# Patient Record
Sex: FEMALE | Race: WHITE | NOT HISPANIC OR LATINO | Employment: FULL TIME | URBAN - NONMETROPOLITAN AREA
[De-identification: names, ages, dates, MRNs, and addresses within clinical notes are randomized per-mention and may not be internally consistent; named-entity substitution may affect disease eponyms.]

---

## 2020-04-20 ENCOUNTER — TELEPHONE (OUTPATIENT)
Dept: OTOLARYNGOLOGY | Facility: CLINIC | Age: 30
End: 2020-04-20

## 2020-05-05 ENCOUNTER — TELEPHONE (OUTPATIENT)
Dept: OTOLARYNGOLOGY | Facility: CLINIC | Age: 30
End: 2020-05-05

## 2020-05-06 ENCOUNTER — TELEPHONE (OUTPATIENT)
Dept: OTOLARYNGOLOGY | Facility: CLINIC | Age: 30
End: 2020-05-06

## 2020-12-04 ENCOUNTER — OFFICE VISIT (OUTPATIENT)
Dept: OBSTETRICS AND GYNECOLOGY | Facility: CLINIC | Age: 30
End: 2020-12-04

## 2020-12-04 VITALS
DIASTOLIC BLOOD PRESSURE: 70 MMHG | WEIGHT: 177 LBS | HEIGHT: 64 IN | SYSTOLIC BLOOD PRESSURE: 108 MMHG | BODY MASS INDEX: 30.22 KG/M2

## 2020-12-04 DIAGNOSIS — Z01.419 WOMEN'S ANNUAL ROUTINE GYNECOLOGICAL EXAMINATION: Primary | ICD-10-CM

## 2020-12-04 PROCEDURE — 99385 PREV VISIT NEW AGE 18-39: CPT | Performed by: OBSTETRICS & GYNECOLOGY

## 2020-12-04 RX ORDER — NICOTINE POLACRILEX 2 MG
GUM BUCCAL
COMMUNITY
End: 2021-09-07 | Stop reason: HOSPADM

## 2020-12-04 RX ORDER — CALCIUM CITRATE 1040MG
TABLET ORAL
COMMUNITY
End: 2020-12-04

## 2020-12-04 RX ORDER — UBIDECARENONE 75 MG
CAPSULE ORAL
COMMUNITY
End: 2021-09-07 | Stop reason: HOSPADM

## 2020-12-04 RX ORDER — FOLIC ACID 1 MG/1
TABLET ORAL
COMMUNITY
End: 2021-09-07 | Stop reason: HOSPADM

## 2020-12-04 NOTE — PROGRESS NOTES
[facility]  Gynecology    CC: Annual well woman exam    HPI  Ginette Lara is a 30 y.o.  premenopausal female who presents for her annual well woman exam.  The patient has no complaints.  Denies any vaginal itching, burning, irritation, or discharge.  Denies any abnormal uterine bleeding.  Continues to be sexually active.  Denies any sexual dysfunction concerns.  Denies any urinary symptoms including incontinence, dysuria, frequency, urgency, nocturia.    She exercises regularly: yes.  She wears her seat belt:yes.  She has concerns about domestic violence: no.  She has noticed changes in height: no.    ROS  Review of Systems   Constitutional: Negative.    HENT: Negative.    Eyes: Negative.    Respiratory: Negative.    Cardiovascular: Negative.    Gastrointestinal: Negative.    Endocrine: Negative.    Genitourinary: Negative.    Musculoskeletal: Positive for back pain.   Skin: Negative.    Neurological: Negative.    Hematological: Negative.    Psychiatric/Behavioral: Negative.       HEALTH MAINTENANCE  Mammogram: N/A  Colonoscopy: N/A  DEXA scan: N/A  Pap smear: 2018    GYN HISTORY  Menarche: age 14  Menses: Regular, every 28 days, lasts 4-7 days, normal flow, no intermenstrual bleeding  History of STIs: None  Last pap smear: 2018, WNL  Abnormal pap smear history: None  Contraception: Condoms    OB HISTORY  OB History    Para Term  AB Living   1 1 1     1   SAB TAB Ectopic Molar Multiple Live Births             1      # Outcome Date GA Lbr Lorne/2nd Weight Sex Delivery Anes PTL Lv   1 Term 10/21/15 39w4d  3345 g (7 lb 6 oz) M Vag-Spont EPI N JENNY     PAST MEDICAL HISTORY  Past Medical History:   Diagnosis Date   • No pertinent past medical history      PAST SURGICAL HISTORY  Past Surgical History:   Procedure Laterality Date   • GASTRIC SLEEVE LAPAROSCOPIC     • MOUTH SURGERY N/A      FAMILY HISTORY  Family History   Problem Relation Age of Onset   • Ovarian cancer  "Neg Hx    • Uterine cancer Neg Hx    • Breast cancer Neg Hx    • Colon cancer Neg Hx    • Cervical cancer Neg Hx      SOCIAL HISTORY  Social History     Socioeconomic History   • Marital status:      Spouse name: Not on file   • Number of children: Not on file   • Years of education: Not on file   • Highest education level: Not on file   Tobacco Use   • Smoking status: Never Smoker   • Smokeless tobacco: Never Used   Substance and Sexual Activity   • Alcohol use: Never     Frequency: Never   • Drug use: Never   • Sexual activity: Yes     HOME MEDICATIONS  Prior to Admission medications    Medication Sig Start Date End Date Taking? Authorizing Provider   Biotin 1 MG capsule biotin   Yes Kamaljit Davison MD   folic acid (FOLVITE) 0.5 MG half tablet folic acid   Yes Kamaljit Davison MD   Prenatal Vit-Fe Fumarate-FA (PRENATAL 1+1 PO) Take 1 tablet by mouth Daily.   Yes Kamaljit Davison MD   Vitamin A 15 MG/ML solution injection vitamin A   Yes Kamaljit Davison MD   vitamin B-12 (CYANOCOBALAMIN) 100 MCG tablet Vitamin B12   Yes Kamaljit Davison MD   Calcium Citrate 1040 MG tablet calcium citrate  12/4/20  Kamaljit Davison MD     ALLERGIES  Allergies   Allergen Reactions   • Amoxicillin-Pot Clavulanate Rash     PE  /70   Ht 162.6 cm (64\")   Wt 80.3 kg (177 lb)   LMP 11/28/2020 (Approximate)   BMI 30.38 kg/m²        General: Alert, healthy, no distress, well nourished and well developed   Neurologic: Alert, oriented to person, place, and time.  Gait normal.  Cranial nerves II-XII grossly intact.  HEENT: Normocephalic, atraumatic.  Extraocular muscles intact, pupils equal and reactive times two.    Neck: Supple, no adenopathy, thyroid normal size, non-tender, without nodularity, trachea midline   Breasts: Declined  Lungs: Normal respiratory effort.  Clear to auscultation bilaterally.   Heart: Regular rate and rhythm, without murmer, rub or gallop.   Abdomen: Soft, non-tender, " non-distended,no masses, no hepatosplenomegaly, no hernia.    Skin: No rash, no lesions.  Extremities: No cyanosis, clubbing or edema  PELVIC EXAM:  External Genitalia/Vulva: Anatomy is normal, no significant redness of labia, no discharge on vulvar tissues, [place]'s and Bartholin's glands are normal, no ulcers, no condylomatous lesions.  Urethral meatus: Normal, no lesions, no prolapse.  Urethra: Normal, no masses, no tenderness with palpation.  Bladder: Normal, no fullness, no masses, no tenderness with palpation.  Vagina: Vaginal tissues are not inflamed, normal color and texture, no significant discharge present.  Pelvic support adequate.  Cervix: Normal, no lesions, no purulent discharge, no cervical motion tenderness.  Uterus: Normal size, shape, and consistency.  Good mobility noted.  Minimal descent noted with good support.  Adnexa: Normal size and shape bilaterally, no palpable mass bilaterally and non-tender bilaterally.  Rectal: Normal, no masses or polyps, confirms bimanual exam, perianal normal, no lesions; NANCY deferred.    IMPRESSION  Ginette Lara is a 30 y.o.  presenting with annual gynecological exam.    PLAN    1. Women's annual routine gynecological examination  - Liquid-based Pap Smear, Screening  - RTC 1 year  - Declines contraception at this time    This document has been electronically signed by Clarissa Carias MD on 2020 16:16 CST.    CC: Patrick Sellers MD

## 2020-12-10 LAB
LAB AP CASE REPORT: NORMAL
PATH INTERP SPEC-IMP: NORMAL

## 2021-09-07 ENCOUNTER — LAB (OUTPATIENT)
Dept: LAB | Facility: HOSPITAL | Age: 31
End: 2021-09-07

## 2021-09-07 ENCOUNTER — INITIAL PRENATAL (OUTPATIENT)
Dept: OBSTETRICS AND GYNECOLOGY | Facility: CLINIC | Age: 31
End: 2021-09-07

## 2021-09-07 VITALS — WEIGHT: 173 LBS | BODY MASS INDEX: 29.7 KG/M2 | DIASTOLIC BLOOD PRESSURE: 58 MMHG | SYSTOLIC BLOOD PRESSURE: 90 MMHG

## 2021-09-07 DIAGNOSIS — O36.80X0 ENCOUNTER TO DETERMINE FETAL VIABILITY OF PREGNANCY, SINGLE OR UNSPECIFIED FETUS: ICD-10-CM

## 2021-09-07 DIAGNOSIS — Z32.01 POSITIVE PREGNANCY TEST: ICD-10-CM

## 2021-09-07 DIAGNOSIS — Z34.80 SUPERVISION OF OTHER NORMAL PREGNANCY: Primary | ICD-10-CM

## 2021-09-07 DIAGNOSIS — Z32.00 PREGNANCY EXAMINATION OR TEST, PREGNANCY UNCONFIRMED: ICD-10-CM

## 2021-09-07 LAB
ABO GROUP BLD: NORMAL
AMPHET+METHAMPHET UR QL: NEGATIVE
AMPHETAMINES UR QL: NEGATIVE
B-HCG UR QL: POSITIVE
BARBITURATES UR QL SCN: NEGATIVE
BASOPHILS # BLD AUTO: 0.04 10*3/MM3 (ref 0–0.2)
BASOPHILS NFR BLD AUTO: 0.5 % (ref 0–1.5)
BENZODIAZ UR QL SCN: NEGATIVE
BILIRUB UR QL STRIP: NEGATIVE
BLD GP AB SCN SERPL QL: NEGATIVE
BUPRENORPHINE SERPL-MCNC: NEGATIVE NG/ML
CANNABINOIDS SERPL QL: NEGATIVE
CLARITY UR: CLEAR
COCAINE UR QL: NEGATIVE
COLOR UR: ABNORMAL
DEPRECATED RDW RBC AUTO: 40.8 FL (ref 37–54)
EOSINOPHIL # BLD AUTO: 0.23 10*3/MM3 (ref 0–0.4)
EOSINOPHIL NFR BLD AUTO: 3 % (ref 0.3–6.2)
ERYTHROCYTE [DISTWIDTH] IN BLOOD BY AUTOMATED COUNT: 13.6 % (ref 12.3–15.4)
GLUCOSE UR STRIP-MCNC: NEGATIVE MG/DL
HBV SURFACE AG SERPL QL IA: NORMAL
HCG INTACT+B SERPL-ACNC: NORMAL MIU/ML
HCT VFR BLD AUTO: 38.8 % (ref 34–46.6)
HCV AB SER DONR QL: NORMAL
HGB BLD-MCNC: 12.6 G/DL (ref 12–15.9)
HGB UR QL STRIP.AUTO: NEGATIVE
HIV1+2 AB SER QL: NORMAL
IMM GRANULOCYTES # BLD AUTO: 0.02 10*3/MM3 (ref 0–0.05)
IMM GRANULOCYTES NFR BLD AUTO: 0.3 % (ref 0–0.5)
INTERNAL NEGATIVE CONTROL: NEGATIVE
INTERNAL POSITIVE CONTROL: POSITIVE
KETONES UR QL STRIP: NEGATIVE
LEUKOCYTE ESTERASE UR QL STRIP.AUTO: ABNORMAL
LYMPHOCYTES # BLD AUTO: 2.43 10*3/MM3 (ref 0.7–3.1)
LYMPHOCYTES NFR BLD AUTO: 31.2 % (ref 19.6–45.3)
Lab: ABNORMAL
Lab: NORMAL
MCH RBC QN AUTO: 27 PG (ref 26.6–33)
MCHC RBC AUTO-ENTMCNC: 32.5 G/DL (ref 31.5–35.7)
MCV RBC AUTO: 83.3 FL (ref 79–97)
METHADONE UR QL SCN: NEGATIVE
MONOCYTES # BLD AUTO: 0.37 10*3/MM3 (ref 0.1–0.9)
MONOCYTES NFR BLD AUTO: 4.8 % (ref 5–12)
NEUTROPHILS NFR BLD AUTO: 4.69 10*3/MM3 (ref 1.7–7)
NEUTROPHILS NFR BLD AUTO: 60.2 % (ref 42.7–76)
NITRITE UR QL STRIP: NEGATIVE
NRBC BLD AUTO-RTO: 0 /100 WBC (ref 0–0.2)
OPIATES UR QL: NEGATIVE
OXYCODONE UR QL SCN: NEGATIVE
PCP UR QL SCN: NEGATIVE
PH UR STRIP.AUTO: 7.5 [PH] (ref 5–8)
PLATELET # BLD AUTO: 282 10*3/MM3 (ref 140–450)
PMV BLD AUTO: 9.5 FL (ref 6–12)
PROPOXYPH UR QL: NEGATIVE
PROT UR QL STRIP: NEGATIVE
RBC # BLD AUTO: 4.66 10*6/MM3 (ref 3.77–5.28)
RH BLD: POSITIVE
RPR SER QL: NORMAL
SP GR UR STRIP: 1.02 (ref 1–1.03)
TRICYCLICS UR QL SCN: NEGATIVE
UROBILINOGEN UR QL STRIP: ABNORMAL
WBC # BLD AUTO: 7.78 10*3/MM3 (ref 3.4–10.8)

## 2021-09-07 PROCEDURE — 36415 COLL VENOUS BLD VENIPUNCTURE: CPT

## 2021-09-07 PROCEDURE — 80081 OBSTETRIC PANEL INC HIV TSTG: CPT | Performed by: NURSE PRACTITIONER

## 2021-09-07 PROCEDURE — 86803 HEPATITIS C AB TEST: CPT | Performed by: NURSE PRACTITIONER

## 2021-09-07 PROCEDURE — 84702 CHORIONIC GONADOTROPIN TEST: CPT | Performed by: NURSE PRACTITIONER

## 2021-09-07 PROCEDURE — 81003 URINALYSIS AUTO W/O SCOPE: CPT | Performed by: NURSE PRACTITIONER

## 2021-09-07 PROCEDURE — 87086 URINE CULTURE/COLONY COUNT: CPT | Performed by: NURSE PRACTITIONER

## 2021-09-07 PROCEDURE — 87491 CHLMYD TRACH DNA AMP PROBE: CPT | Performed by: NURSE PRACTITIONER

## 2021-09-07 PROCEDURE — 87591 N.GONORRHOEAE DNA AMP PROB: CPT | Performed by: NURSE PRACTITIONER

## 2021-09-07 PROCEDURE — 87661 TRICHOMONAS VAGINALIS AMPLIF: CPT | Performed by: NURSE PRACTITIONER

## 2021-09-07 PROCEDURE — 80306 DRUG TEST PRSMV INSTRMNT: CPT | Performed by: NURSE PRACTITIONER

## 2021-09-07 PROCEDURE — 81025 URINE PREGNANCY TEST: CPT | Performed by: NURSE PRACTITIONER

## 2021-09-07 RX ORDER — MULTIPLE VITAMINS W/ MINERALS TAB 9MG-400MCG
1 TAB ORAL DAILY
COMMUNITY
End: 2022-05-09

## 2021-09-07 NOTE — PROGRESS NOTES
I spent approximately 40 minutes with the patient acquiring the health and history intake and discussing topics related to healthy lifestyle. She had a positive home pregnancy test. Her UPT in office today is positive. Her LMP is 7/22/21. This is her 2nd pregnancy. She had a vaginal delivery at [facility] in [place] on 10/21/15. Her son was delivered by Dr. Abebe. She signed a release for us to get her delivery note.   A newob bag is given. The 1st trimester teaching was done with the patient. We discussed a healthy diet and exercise and what is recommended. I also discussed Listeriosis and Toxoplasmosis and what fish to avoid due to high mercury levels. I informed patient not to be in hot tubs, saunas, or tanning beds. I encouraged her to make an appointment with the dentist if she has not had a dental exam and cleaning in the last 6 months. The patient denies use of alcohol, illicit drug use, and tobacco smoking. She plans to breastfeed. She  her son also.  I gave her pamphlet on breastfeeding classes and the breastfeeding mothers support group. These services are provided by [facility], Lactation [facility]. She filled out the depression screening questionnaire and scored 4. She did have postpartum depression after her son was born and was on Lexapro for a short period of time. She denies any problems today with depression.  I encouraged the patient to get the TDAP vaccine in the 3rd trimester.  I discussed with the patient that a pediatrician needs to be chosen prior to delivery for the infant to have an appointment scheduled before leaving the hospital. Her son sees a pediatrician in [place].  I discussed lab tests will be done today. Her last pap smear was negative on 12/4/20. All questions were answered at this time. She plans to see Dr. Carias this pregnancy. She is scheduled for an ultrasound and to see Dr. Guerra on 9/21/21.

## 2021-09-08 LAB
BACTERIA SPEC AEROBE CULT: ABNORMAL
C TRACH RRNA CVX QL NAA+PROBE: NEGATIVE
N GONORRHOEA RRNA SPEC QL NAA+PROBE: NEGATIVE
RUBV IGG SERPL IA-ACNC: 1.74 INDEX
TRICHOMONAS VAGINALIS PCR: NEGATIVE

## 2021-09-21 ENCOUNTER — INITIAL PRENATAL (OUTPATIENT)
Dept: OBSTETRICS AND GYNECOLOGY | Facility: CLINIC | Age: 31
End: 2021-09-21

## 2021-09-21 VITALS — WEIGHT: 147.8 LBS | SYSTOLIC BLOOD PRESSURE: 108 MMHG | DIASTOLIC BLOOD PRESSURE: 62 MMHG | BODY MASS INDEX: 25.37 KG/M2

## 2021-09-21 DIAGNOSIS — Z86.59 HISTORY OF POSTPARTUM DEPRESSION: ICD-10-CM

## 2021-09-21 DIAGNOSIS — O26.891 PREGNANCY HEADACHE IN FIRST TRIMESTER: ICD-10-CM

## 2021-09-21 DIAGNOSIS — Z34.80 SUPERVISION OF OTHER NORMAL PREGNANCY, ANTEPARTUM: Primary | ICD-10-CM

## 2021-09-21 DIAGNOSIS — R51.9 PREGNANCY HEADACHE IN FIRST TRIMESTER: ICD-10-CM

## 2021-09-21 DIAGNOSIS — Z87.59 HISTORY OF POSTPARTUM DEPRESSION: ICD-10-CM

## 2021-09-21 DIAGNOSIS — O09.299 HISTORY OF FORCEPS DELIVERY IN PRIOR PREGNANCY, CURRENTLY PREGNANT: ICD-10-CM

## 2021-09-21 DIAGNOSIS — O99.841 BARIATRIC SURGERY STATUS COMPLICATING PREGNANCY, FIRST TRIMESTER: ICD-10-CM

## 2021-09-21 DIAGNOSIS — O21.9 NAUSEA AND VOMITING DURING PREGNANCY: ICD-10-CM

## 2021-09-21 PROCEDURE — 0501F PRENATAL FLOW SHEET: CPT | Performed by: STUDENT IN AN ORGANIZED HEALTH CARE EDUCATION/TRAINING PROGRAM

## 2021-09-21 RX ORDER — PRENATAL VIT NO.126/IRON/FOLIC 28MG-0.8MG
1 TABLET ORAL DAILY
COMMUNITY

## 2021-09-21 RX ORDER — MAGNESIUM OXIDE 400 MG/1
400 TABLET ORAL DAILY
Qty: 30 TABLET | Refills: 2 | Status: SHIPPED | OUTPATIENT
Start: 2021-09-21 | End: 2022-05-09

## 2021-09-21 RX ORDER — DIPHENHYDRAMINE HYDROCHLORIDE 25 MG/1
25 CAPSULE ORAL 3 TIMES DAILY
Qty: 90 TABLET | Refills: 2 | Status: SHIPPED | OUTPATIENT
Start: 2021-09-21 | End: 2022-04-23 | Stop reason: HOSPADM

## 2021-09-21 NOTE — PATIENT INSTRUCTIONS
If vaginal bleeding, persistent cramping, uncontrollable nausea/vomiting, severe pain, call office or return.

## 2021-09-21 NOTE — PROGRESS NOTES
[facility]  Obstetrics Visit    CHIEF COMPLAINT:  New prenatal visit    HISTORY OF PRESENT ILLNESS:  Ginette Lara is a 31 y.o. y/o  at 8w5d by LMP (Patient's last menstrual period was 2021 (exact date).).  This was a planned pregnancy and the patient is supported by her .  Reports daily nausea with occasional vomiting. Still able to tolerate food and fluids. Taking PNV (taking vitamin without iron as previously caused her to have headaches). Reports breast tenderness.  She denies any vaginal bleeding or cramping. Daily headaches. Nonradiating, no aura or neuro symptoms. No vision changes. Not taking medications. Resolve with rest. History of headaches outside of pregnancy and similarly bad in first trimester last pregnancy.     Reports FAVD in last pregnancy after long labor due to maternal exhaustion. Reports 2nd degree tear, son still has small scar on head from forceps. Postpartum depression in last pregnancy. Took Lexapro briefly. No depression now. Somewhat piedra, which she relates to feeling poorly with headaches, fatigue, and nausea.     REVIEW OF SYSTEMS  Review of Systems   Constitutional: Positive for fatigue.   HENT: Negative.    Eyes: Negative.    Respiratory: Negative.    Cardiovascular: Negative.    Gastrointestinal: Positive for nausea and vomiting. Negative for constipation, diarrhea and GERD.   Genitourinary: Negative.    Musculoskeletal: Negative.    Neurological: Negative.    Psychiatric/Behavioral: Negative.        PRENATAL RISK FACTORS   Problems (from 21 to present)     No problems associated with this episode.          DATING CRITERIA:  LMP (21) -- TOO 21  1TUS (21 8w1d) -- TOO 21    OBSTETRIC HISTORY:  OB History    Para Term  AB Living   2 1 1     1   SAB TAB Ectopic Molar Multiple Live Births             1      # Outcome Date GA Lbr Lorne/2nd Weight Sex Delivery Anes PTL Lv   2 Current            1  Term 10/21/15 39w5d  3345 g (7 lb 6 oz) M Vag-Forceps EPI N JENNY      Birth Comments: ELECTIVE IOL      GYN HISTORY:  Denies h/o sexually transmitted infections/pelvic inflammatory disease  Denies h/o abnormal pap smears  Last pap smear:   Last Completed Pap Smear          PAP SMEAR (Every 3 Years) Next due on 12/4/2023 12/04/2020  Liquid-based Pap Smear, Screening              Denies h/o gynecologic surgeries, including biopsies of the cervix    PAST MEDICAL HISTORY:  Past Medical History:   Diagnosis Date   • History of postpartum depression    • No pertinent past medical history    • Varicella      PAST SURGICAL HISTORY:  Past Surgical History:   Procedure Laterality Date   • GASTRIC SLEEVE LAPAROSCOPIC     • MANDIBLE FRACTURE SURGERY     • MOUTH SURGERY N/A 2007   • WISDOM TOOTH EXTRACTION       FAMILY HISTORY:  Family History   Problem Relation Age of Onset   • Hypertension Father    • Kidney disease Mother    • No Known Problems Sister    • No Known Problems Son    • Pneumonia Maternal Grandmother    • Diabetes Maternal Grandfather    • Hypertension Maternal Grandfather    • No Known Problems Paternal Grandmother    • Lymphoma Paternal Grandfather         Non-Hodgkin's    • Ovarian cancer Neg Hx    • Uterine cancer Neg Hx    • Breast cancer Neg Hx    • Colon cancer Neg Hx    • Cervical cancer Neg Hx      SOCIAL HISTORY:  Social History     Socioeconomic History   • Marital status:      Spouse name: Not on file   • Number of children: Not on file   • Years of education: Not on file   • Highest education level: Not on file   Tobacco Use   • Smoking status: Never Smoker   • Smokeless tobacco: Never Used   Substance and Sexual Activity   • Alcohol use: Never   • Drug use: Never   • Sexual activity: Yes     Partners: Male     Comment: last pap smear 12/4/20 negative      GENETIC SCREENING:  Age >36 yo as of TOO: No  Thalassemia: no hx  NTD: no hx  CHD: no hx  Down Syndrome/MR/Fragile X/Autism: no  hx  Ashkenazi Orthodoxy with Chacorta-Sachs, [place], familial dysautonomia: no hx  Sickle cell disease or trait: no hx  Hemophilia: no hx  Muscular dystrophy: no hx  Cystic fibrosis: no hx  [place]'s chorea: no hx  Birth defects: no hx  Genetic/chromosomal disorders: no hx    INFECTION HISTORY:  TB exposure: no hx  HSV: demoes  Illness since LMP: denies  Prior GBS infected child: no, neg GBS last pregnancy  STIs: denies    ALLERGIES:  Allergies   Allergen Reactions   • Amoxicillin-Pot Clavulanate Rash       MEDICATIONS:  Prior to Admission medications    Medication Sig Start Date End Date Taking? Authorizing Provider   prenatal vitamin (prenatal, CLASSIC, vitamin) tablet Take 1 tablet by mouth Daily.   Yes Kamaljit Davison MD   multivitamin with minerals (MULTIVITAMIN ADULT PO) Take 1 tablet by mouth Daily.    ProviderKamaljit MD       PHYSICAL EXAM:   /62   Wt 67 kg (147 lb 12.8 oz)   LMP 07/22/2021 (Exact Date)   BMI 25.37 kg/m²   General: Alert, healthy, no distress, well nourished and well developed.  Neurologic: Alert, oriented to person, place, and time.  Gait normal.  Cranial nerves II-XII grossly intact.  HEENT: Normocephalic, atraumatic.  Extraocular muscles intact.  Teeth: Normal hygiene.  Neck: Supple, no adenopathy, thyroid normal size, non-tender, without nodularity, trachea midline.  Breasts: No masses, skin dimpling, skin retraction, nipple discharge, or asymmetry bilaterally.  Lungs: Normal respiratory effort.  Clear to auscultation bilaterally.  No wheezes, rhonci, or rales.  Heart: Regular rate and rhythm.  No murmer, rub or gallop.  Abdomen: Soft, non-tender, non-distended,no masses, no hepatosplenomegaly, no hernia.  Skin: No rash, no lesions.  Extremities: No cyanosis, clubbing or edema.  PELVIC EXAM:  External Genitalia/Vulva: Anatomy is normal, no significant redness of labia, no discharge on vulvar tissues, [place]'s and Bartholin's glands are normal, no ulcers, no condylomatous  lesions.  Urethra: Normal, no lesions.  Vagina: Vaginal tissues are not inflamed, normal color and texture, no significant discharge present.  Cervix: Palpably Normal without lesions or purulent discharge, no cervical motion tenderness.  Uterus: Normal size, shape, and consistency.  Adnexa: Normal size and shape bilaterally, no palpable mass bilaterally and non-tender bilaterally.    Preliminary report of US prior to appt: IUP at 8+1, consistent with LMP, + bpm, +yolk sac, normal right ovary, corpus luteum cyst on left ovary, probable fibroid posterior fundus 3.7 x 3.8 x 4.0 cm    IMPRESSION:  Ginette Lara is a 31 y.o.  at 8w5d for a new prenatal visit.    PLAN:  1.  IUP at 8w5d  - Options counseling performed and patient desires continuation of pregnancy to term   - Prenatal labs ordered  - Genetic testing, including cystic fibrosis, was discussed and patient desires cfDNA, to be done after 10 weeks (patient okay to be done at next appt>>ordered and in computer)  - Continue prenatal vitamins  - Weight gain counseling performed.   - Pregravid BMI 25-29.9: Recommend 15-25 lb  - Return to clinic in 4 weeks for return prenatal visit  - Reviewed COVID-19 visitation policy  - Reviewed COVID-19 precautions    Problem List Items Addressed This Visit        Gravid and     Bariatric surgery status complicating pregnancy, first trimester    Overview     No h/o diarrhea or concerns for malnutrition, regular balanced diet  Gastric band 2018  Limited studies of bariatric surgery in pregnancy. Consider testing for iron, vit B12, folate, vit D, Ca if concerns for malnutrition. Decreased risk of GDM and Preeclampsia. Possible increased risk of CD. Monitor for signs of intestinal obstruction and GI hemorrhage.         History of postpartum depression        Discussed mood monitoring, counseling if desired during pregnancy        If signs of depression during pregnancy, there are medications safe  in pregnancy    Supervision of other normal pregnancy, antepartum - Primary    Overview     Plans to breastfeed  PNL reviewed: Rh+, Hgb 12.6, Plt 282  Last pap 12/4/20 NIL/HPV neg  Desires cfDNA testing, ordered for next visit         Relevant Orders    INVITAE NIPS    Nausea and vomiting during pregnancy          Vit B6 and Unisom sent to pharmacy          Counseled on lifestyle modifications: small frequent meals, no high fat/spicy/acidic foods, po hydration, lio/mint candies    History of forceps delivery in prior pregnancy, currently pregnant           For maternal exhaustion per patient and , 2nd degree lac       Neuro    Pregnancy headache in first trimester            Will trial Mag Oxide            Discussed monitoring for loose stools, worsening HA, neuro sx            Tylenol safe in pregnancy prn            Maria Del Rosario Guerra DO  9/21/2021  09:32 CDT

## 2021-09-21 NOTE — PROGRESS NOTES
[facility]  Obstetrics Visit    CHIEF COMPLAINT:  New prenatal visit    HISTORY OF PRESENT ILLNESS:  Ginette Lara is a 31 y.o. y/o  at 8w5d by LMP (Patient's last menstrual period was 2021 (exact date).).  This was a ***planned pregnancy and the patient is supported by ***.  Reports *** nausea with*** vomiting.  ***Reports breast tenderness.  She denies any vaginal bleeding.  She has *** started taking a prenatal vitamin.    Nausea-B6 and unisom  Has-send mag ox  1st tri-10-14 NT at 13  Quad-15-22  cfDNA 10w  Carrier screen    Prior  boy with Dr. Abebe in [place] 10/21/15  H/o PP depression, h/o lsc gastric sleeve  Plans to breastfeed  PNL reviewed: Rh+, Hgb 12.6, Plt 282  Last pap 20 NIL/HPV neg  Wants to see Dr. Carias?  Pregnancy after Bariatric surgery: more likely to have had prior CD, develop GDM, and give birth via CD. Decrease risk of HTN and pregestational DM after surgery, GDM and preeclampsia. Possible increased risk of intestinal obstruction and GI hemorrhage.  Limited studies but no inceased risk for infants  Consider protein, iron, vit B12, folate, vit D, ca levels but maybe not with band?       REVIEW OF SYSTEMS  Review of Systems    PRENATAL RISK FACTORS   Problems (from 21 to present)     No problems associated with this episode.          DATING CRITERIA:  LMP (***) -- TOO ***  1TUS (*** at ***w***d) -- TOO ***    OBSTETRIC HISTORY:  OB History    Para Term  AB Living   2 1 1     1   SAB TAB Ectopic Molar Multiple Live Births             1      # Outcome Date GA Lbr Lorne/2nd Weight Sex Delivery Anes PTL Lv   2 Current            1 Term 10/21/15 39w5d  3345 g (7 lb 6 oz) M Vag-Forceps EPI N JENNY      Birth Comments: ELECTIVE IOL    Had placenta previa last pregnancy and stated watched closely  FAVD due to maternal exhaustion   2nd degree lac    GYN HISTORY:  Denies h/o sexually transmitted infections/pelvic inflammatory  disease  Denies h/o abnormal pap smears  Last pap smear:   Last Completed Pap Smear          PAP SMEAR (Every 3 Years) Next due on 12/4/2023 12/04/2020  Liquid-based Pap Smear, Screening              Denies h/o gynecologic surgeries, including biopsies of the cervix    PAST MEDICAL HISTORY:  Past Medical History:   Diagnosis Date   • History of postpartum depression    • No pertinent past medical history    • Varicella      PAST SURGICAL HISTORY:  Past Surgical History:   Procedure Laterality Date   • GASTRIC SLEEVE LAPAROSCOPIC     • MANDIBLE FRACTURE SURGERY     • MOUTH SURGERY N/A 2007   • WISDOM TOOTH EXTRACTION       FAMILY HISTORY:  Family History   Problem Relation Age of Onset   • Hypertension Father    • Kidney disease Mother    • No Known Problems Sister    • No Known Problems Son    • Pneumonia Maternal Grandmother    • Diabetes Maternal Grandfather    • Hypertension Maternal Grandfather    • No Known Problems Paternal Grandmother    • Lymphoma Paternal Grandfather         Non-Hodgkin's    • Ovarian cancer Neg Hx    • Uterine cancer Neg Hx    • Breast cancer Neg Hx    • Colon cancer Neg Hx    • Cervical cancer Neg Hx      SOCIAL HISTORY:  Social History     Socioeconomic History   • Marital status:      Spouse name: Not on file   • Number of children: Not on file   • Years of education: Not on file   • Highest education level: Not on file   Tobacco Use   • Smoking status: Never Smoker   • Smokeless tobacco: Never Used   Substance and Sexual Activity   • Alcohol use: Never   • Drug use: Never   • Sexual activity: Yes     Partners: Male     Comment: last pap smear 12/4/20 negative      GENETIC SCREENING:  Age >36 yo as of TOO: ***  Thalassemia: ***  NTD: ***  CHD: ***  Down Syndrome/MR/Fragile X/Autism: ***  Ashkenazi Oriental orthodox with Chacorta-Sachs, [place], familial dysautonomia: ***  Sickle cell disease or trait: ***  Hemophilia: ***  Muscular dystrophy: ***  Cystic fibrosis: ***  Payne's chorea:  ***  Birth defects: ***  Genetic/chromosomal disorders: ***    INFECTION HISTORY:  TB exposure: ***  HSV: ***  Illness since LMP: ***  Prior GBS infected child: no  STIs: ***    ALLERGIES:  Allergies   Allergen Reactions   • Amoxicillin-Pot Clavulanate Rash       MEDICATIONS:  Prior to Admission medications    Medication Sig Start Date End Date Taking? Authorizing Provider   prenatal vitamin (prenatal, CLASSIC, vitamin) tablet Take 1 tablet by mouth Daily.   Yes Kamaljit Davison MD   multivitamin with minerals (MULTIVITAMIN ADULT PO) Take 1 tablet by mouth Daily.    ProviderKamaljit MD       PHYSICAL EXAM:   /62   Wt 67 kg (147 lb 12.8 oz)   LMP 07/22/2021 (Exact Date)   BMI 25.37 kg/m²   General: Alert, healthy, no distress, well nourished and well developed.  Neurologic: Alert, oriented to person, place, and time.  Gait normal.  Cranial nerves II-XII grossly intact.  HEENT: Normocephalic, atraumatic.  Extraocular muscles intact, pupils equal and reactive x2.    Teeth: Normal hygiene.  Neck: Supple, no adenopathy, thyroid normal size, non-tender, without nodularity, trachea midline.  Breasts: No masses, skin dimpling, skin retraction, nipple discharge, or asymmetry bilaterally.  Lungs: Normal respiratory effort.  Clear to auscultation bilaterally.  No wheezes, rhonci, or rales.  Heart: Regular rate and rhythm.  No murmer, rub or gallop.  Abdomen: Soft, non-tender, non-distended,no masses, no hepatosplenomegaly, no hernia.  Skin: No rash, no lesions.  Extremities: No cyanosis, clubbing or edema.  PELVIC EXAM:  External Genitalia/Vulva: Anatomy is normal, no significant redness of labia, no discharge on vulvar tissues, [place]'s and Bartholin's glands are normal, no ulcers, no condylomatous lesions.  Urethra: Normal, no lesions.  Vagina: Vaginal tissues are not inflamed, normal color and texture, no significant discharge present.  Cervix: Normal in appearance without lesions or purulent discharge, no  cervical motion tenderness.  Uterus: Normal size, shape, and consistency.  Adnexa: Normal size and shape bilaterally, no palpable mass bilaterally and non-tender bilaterally.    ***Bedside ultrasound performed by myself which shows the findings below:  ***    IMPRESSION:  Ginette Lara is a 31 y.o.  at 8w5d for a new prenatal visit.    PLAN:  1.  IUP at ***w***d  - Options counseling performed and patient desires continuation of pregnancy to term   - Prenatal labs ordered  - Genetic testing, including cystic fibrosis, was discussed and patient ***  - Continue*** prenatal vitamins  - Weight gain counseling performed.   - {[id number]}  - Return to clinic in 4 weeks for return prenatal visit  - Reviewed COVID-19 visitation policy  - Reviewed COVID-19 precautions     Diagnosis Plan   1. Bariatric surgery status complicating pregnancy, first trimester     2. History of postpartum depression       Maria Del Rosario Guerra DO  2021  08:43 CDT

## 2021-10-19 ENCOUNTER — ROUTINE PRENATAL (OUTPATIENT)
Dept: OBSTETRICS AND GYNECOLOGY | Facility: CLINIC | Age: 31
End: 2021-10-19

## 2021-10-19 ENCOUNTER — LAB (OUTPATIENT)
Dept: LAB | Facility: HOSPITAL | Age: 31
End: 2021-10-19

## 2021-10-19 VITALS — DIASTOLIC BLOOD PRESSURE: 58 MMHG | BODY MASS INDEX: 30.66 KG/M2 | SYSTOLIC BLOOD PRESSURE: 102 MMHG | WEIGHT: 178.6 LBS

## 2021-10-19 DIAGNOSIS — Z3A.12 12 WEEKS GESTATION OF PREGNANCY: ICD-10-CM

## 2021-10-19 DIAGNOSIS — R51.9 PREGNANCY HEADACHE IN FIRST TRIMESTER: ICD-10-CM

## 2021-10-19 DIAGNOSIS — O99.841 BARIATRIC SURGERY STATUS COMPLICATING PREGNANCY, FIRST TRIMESTER: ICD-10-CM

## 2021-10-19 DIAGNOSIS — O26.891 PREGNANCY HEADACHE IN FIRST TRIMESTER: ICD-10-CM

## 2021-10-19 DIAGNOSIS — Z86.59 HISTORY OF POSTPARTUM DEPRESSION: ICD-10-CM

## 2021-10-19 DIAGNOSIS — Z87.59 HISTORY OF POSTPARTUM DEPRESSION: ICD-10-CM

## 2021-10-19 DIAGNOSIS — O21.9 NAUSEA AND VOMITING DURING PREGNANCY: ICD-10-CM

## 2021-10-19 DIAGNOSIS — O09.299 HISTORY OF FORCEPS DELIVERY IN PRIOR PREGNANCY, CURRENTLY PREGNANT: ICD-10-CM

## 2021-10-19 DIAGNOSIS — O09.91 SUPERVISION OF HIGH RISK PREGNANCY IN FIRST TRIMESTER: Primary | ICD-10-CM

## 2021-10-19 PROCEDURE — 0502F SUBSEQUENT PRENATAL CARE: CPT | Performed by: OBSTETRICS & GYNECOLOGY

## 2021-10-19 NOTE — PROGRESS NOTES
CC: Prenatal visit    Ginette Lara is a 31 y.o.  at 12w5d.  Doing well.  Denies contractions, LOF, or VB.  Reports that she does not take the magnesium because her HA are becoming less frequent.    /58   Wt 81 kg (178 lb 9.6 oz)   LMP 2021 (Exact Date)   BMI 30.66 kg/m²   Fetal Heart Rate: 140s     Problems (from 21 to present)     Problem Noted Resolved    Bariatric surgery status complicating pregnancy, first trimester 2021 by Maria Del Rosario Guerra,  No    Overview Signed 2021  9:24 AM by Maria Del Rosario Guerra DO     No h/o diarrhea or concerns for malnutrition, regular balanced diet  Gastric band 2018  Limited studies of bariatric surgery in pregnancy. Consider testing for iron, vit B12, folate, vit D, Ca if concerns for malnutrition. Decreased risk of GDM and Preeclampsia. Possible increased risk of CD. Monitor for signs of intestinal obstruction and GI hemorrhage.         History of postpartum depression 2021 by Maria Del Rosario Guerra DO No    Supervision of high risk pregnancy in first trimester 2021 by Maria Del Rosario Guerra DO No    Overview Signed 2021  9:22 AM by Maria Del Rosario Guerra DO     Plans to breastfeed  PNL reviewed: Rh+, Hgb 12.6, Plt 282  Last pap 20 NIL/HPV neg  Desires cfDNA testing, ordered for next visit         Pregnancy headache in first trimester 2021 by Maria Del Rosario Guerra DO No    Nausea and vomiting during pregnancy 2021 by Maria Del Rosario Guerra DO No    History of forceps delivery in prior pregnancy, currently pregnant 2021 by Maria Del Rosario Guerra DO No        A/P: Ginette Lara is a 31 y.o.  at 12w5d.  - RTC in 4 weeks  - NIPT today  - Reviewed COVID-19 visitation policy  - Reviewed COVID-19 precautions     Diagnosis Plan   1. Supervision of high risk pregnancy in first trimester     2. Pregnancy headache in first trimester     3. Nausea and vomiting during pregnancy     4. History of postpartum depression     5. History of  forceps delivery in prior pregnancy, currently pregnant     6. Bariatric surgery status complicating pregnancy, first trimester     7. 12 weeks gestation of pregnancy       Clarissa Carias MD  10/19/2021  16:42 CDT

## 2021-10-26 ENCOUNTER — TELEPHONE (OUTPATIENT)
Dept: OBSTETRICS AND GYNECOLOGY | Facility: CLINIC | Age: 31
End: 2021-10-26

## 2021-10-26 NOTE — TELEPHONE ENCOUNTER
Patient called and would like a call back regarding her lab results? Her number to call back is [phone].          Thank you,      Lesley

## 2021-10-27 ENCOUNTER — TELEPHONE (OUTPATIENT)
Dept: OBSTETRICS AND GYNECOLOGY | Facility: CLINIC | Age: 31
End: 2021-10-27

## 2021-10-27 NOTE — TELEPHONE ENCOUNTER
Called Mrs. Lara this morning to let her know the gender of her baby according to the genetic testing was a girl. There was a delay in the results and apologized multiple times for the delay. Patient was very understanding and was very excited about the gender.

## 2021-10-27 NOTE — TELEPHONE ENCOUNTER
----- Message from Kirsten Gaspar MA sent at 10/26/2021  3:17 PM CDT -----  Regarding: FW: Genetic screening    ----- Message -----  From: Ginette Lara  Sent: 10/26/2021  11:29 AM CDT  To: [facility] Clinical Pool  Subject: Genetic screening                                Have you heard anything about the gender?

## 2021-11-01 DIAGNOSIS — Z34.80 SUPERVISION OF OTHER NORMAL PREGNANCY, ANTEPARTUM: ICD-10-CM

## 2021-11-16 ENCOUNTER — ROUTINE PRENATAL (OUTPATIENT)
Dept: OBSTETRICS AND GYNECOLOGY | Facility: CLINIC | Age: 31
End: 2021-11-16

## 2021-11-16 ENCOUNTER — TELEPHONE (OUTPATIENT)
Dept: OBSTETRICS AND GYNECOLOGY | Facility: CLINIC | Age: 31
End: 2021-11-16

## 2021-11-16 VITALS — BODY MASS INDEX: 30.86 KG/M2 | SYSTOLIC BLOOD PRESSURE: 110 MMHG | WEIGHT: 179.8 LBS | DIASTOLIC BLOOD PRESSURE: 68 MMHG

## 2021-11-16 DIAGNOSIS — Z3A.16 16 WEEKS GESTATION OF PREGNANCY: ICD-10-CM

## 2021-11-16 DIAGNOSIS — O26.892 PREGNANCY HEADACHE IN SECOND TRIMESTER: ICD-10-CM

## 2021-11-16 DIAGNOSIS — O99.842 PREGNANCY AFFECTED BY PREVIOUS BARIATRIC SURGERY, CURRENTLY IN SECOND TRIMESTER: ICD-10-CM

## 2021-11-16 DIAGNOSIS — O21.9 NAUSEA AND VOMITING DURING PREGNANCY: ICD-10-CM

## 2021-11-16 DIAGNOSIS — Z87.59 HISTORY OF POSTPARTUM DEPRESSION: ICD-10-CM

## 2021-11-16 DIAGNOSIS — Z86.59 HISTORY OF POSTPARTUM DEPRESSION: ICD-10-CM

## 2021-11-16 DIAGNOSIS — O09.92 SUPERVISION OF HIGH RISK PREGNANCY IN SECOND TRIMESTER: Primary | ICD-10-CM

## 2021-11-16 DIAGNOSIS — R51.9 PREGNANCY HEADACHE IN SECOND TRIMESTER: ICD-10-CM

## 2021-11-16 DIAGNOSIS — O09.299 HISTORY OF FORCEPS DELIVERY IN PRIOR PREGNANCY, CURRENTLY PREGNANT: ICD-10-CM

## 2021-11-16 PROBLEM — O99.840 PREVIOUS BARIATRIC SURGERY COMPLICATING PREGNANCY: Status: ACTIVE | Noted: 2021-09-21

## 2021-11-16 PROCEDURE — 0502F SUBSEQUENT PRENATAL CARE: CPT | Performed by: OBSTETRICS & GYNECOLOGY

## 2021-11-16 RX ORDER — CYCLOBENZAPRINE HCL 10 MG
10 TABLET ORAL 3 TIMES DAILY PRN
Qty: 30 TABLET | Refills: 6 | Status: SHIPPED | OUTPATIENT
Start: 2021-11-16 | End: 2022-05-09
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
## 2021-12-15 DIAGNOSIS — O09.92 SUPERVISION OF HIGH RISK PREGNANCY IN SECOND TRIMESTER: ICD-10-CM

## 2021-12-21 ENCOUNTER — ROUTINE PRENATAL (OUTPATIENT)
Dept: OBSTETRICS AND GYNECOLOGY | Facility: CLINIC | Age: 31
End: 2021-12-21

## 2021-12-21 VITALS — BODY MASS INDEX: 31.07 KG/M2 | DIASTOLIC BLOOD PRESSURE: 68 MMHG | WEIGHT: 181 LBS | SYSTOLIC BLOOD PRESSURE: 102 MMHG

## 2021-12-21 DIAGNOSIS — O09.92 SUPERVISION OF HIGH RISK PREGNANCY IN SECOND TRIMESTER: Primary | ICD-10-CM

## 2021-12-21 DIAGNOSIS — O26.892 PREGNANCY HEADACHE IN SECOND TRIMESTER: ICD-10-CM

## 2021-12-21 DIAGNOSIS — O09.299 HISTORY OF FORCEPS DELIVERY IN PRIOR PREGNANCY, CURRENTLY PREGNANT: ICD-10-CM

## 2021-12-21 DIAGNOSIS — R51.9 PREGNANCY HEADACHE IN SECOND TRIMESTER: ICD-10-CM

## 2021-12-21 DIAGNOSIS — O21.9 NAUSEA AND VOMITING DURING PREGNANCY: ICD-10-CM

## 2021-12-21 DIAGNOSIS — O99.842 PREGNANCY AFFECTED BY PREVIOUS BARIATRIC SURGERY, CURRENTLY IN SECOND TRIMESTER: ICD-10-CM

## 2021-12-21 DIAGNOSIS — Z86.59 HISTORY OF POSTPARTUM DEPRESSION: ICD-10-CM

## 2021-12-21 DIAGNOSIS — Z3A.21 21 WEEKS GESTATION OF PREGNANCY: ICD-10-CM

## 2021-12-21 DIAGNOSIS — Z87.59 HISTORY OF POSTPARTUM DEPRESSION: ICD-10-CM

## 2021-12-21 PROCEDURE — 0502F SUBSEQUENT PRENATAL CARE: CPT | Performed by: OBSTETRICS & GYNECOLOGY

## 2021-12-22 ENCOUNTER — TELEPHONE (OUTPATIENT)
Dept: OBSTETRICS AND GYNECOLOGY | Facility: CLINIC | Age: 31
End: 2021-12-22

## 2021-12-22 RX ORDER — ONDANSETRON 4 MG/1
4 TABLET, FILM COATED ORAL DAILY PRN
Qty: 30 TABLET | Refills: 6 | Status: SHIPPED | OUTPATIENT
Start: 2021-12-22 | End: 2022-05-09

## 2021-12-22 NOTE — TELEPHONE ENCOUNTER
Called and spoke with patient, confirmed which [facility] she would like [facility]  Patient prefers [facility] for her  to  medication.  Let her know we sent in zofran to her pharmacy.  Patient verbalized understanding
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
## 2021-12-22 NOTE — TELEPHONE ENCOUNTER
Patient called requesting a prescription for nausea to be sent into [facility] on [address]. She stated she has been vomiting since about 3am. Patient can be reached at #[phone] to discuss further if necessary.    Thanks,  Yusra

## 2022-01-18 ENCOUNTER — ROUTINE PRENATAL (OUTPATIENT)
Dept: OBSTETRICS AND GYNECOLOGY | Facility: CLINIC | Age: 32
End: 2022-01-18

## 2022-01-18 VITALS — DIASTOLIC BLOOD PRESSURE: 66 MMHG | BODY MASS INDEX: 32.27 KG/M2 | SYSTOLIC BLOOD PRESSURE: 114 MMHG | WEIGHT: 188 LBS

## 2022-01-18 DIAGNOSIS — Z87.59 HISTORY OF POSTPARTUM DEPRESSION: ICD-10-CM

## 2022-01-18 DIAGNOSIS — O26.892 PREGNANCY HEADACHE IN SECOND TRIMESTER: ICD-10-CM

## 2022-01-18 DIAGNOSIS — O09.299 HISTORY OF FORCEPS DELIVERY IN PRIOR PREGNANCY, CURRENTLY PREGNANT: ICD-10-CM

## 2022-01-18 DIAGNOSIS — O09.92 SUPERVISION OF HIGH RISK PREGNANCY IN SECOND TRIMESTER: ICD-10-CM

## 2022-01-18 DIAGNOSIS — O21.9 NAUSEA AND VOMITING DURING PREGNANCY: ICD-10-CM

## 2022-01-18 DIAGNOSIS — R51.9 PREGNANCY HEADACHE IN SECOND TRIMESTER: ICD-10-CM

## 2022-01-18 DIAGNOSIS — Z3A.25 25 WEEKS GESTATION OF PREGNANCY: Primary | ICD-10-CM

## 2022-01-18 DIAGNOSIS — Z86.59 HISTORY OF POSTPARTUM DEPRESSION: ICD-10-CM

## 2022-01-18 PROCEDURE — 0502F SUBSEQUENT PRENATAL CARE: CPT | Performed by: OBSTETRICS & GYNECOLOGY

## 2022-01-18 RX ORDER — BLOOD-GLUCOSE METER
1 KIT MISCELLANEOUS 4 TIMES DAILY
Qty: 1 EACH | Refills: 0 | Status: SHIPPED | OUTPATIENT
Start: 2022-01-18 | End: 2022-03-21

## 2022-01-18 RX ORDER — BLOOD PRESSURE TEST KIT
1 KIT MISCELLANEOUS 4 TIMES DAILY
Qty: 120 EACH | Refills: 11 | Status: SHIPPED | OUTPATIENT
Start: 2022-01-18 | End: 2022-03-21

## 2022-01-18 RX ORDER — LANCETS 30 GAUGE
1 EACH MISCELLANEOUS 4 TIMES DAILY
Qty: 200 EACH | Refills: 11 | Status: SHIPPED | OUTPATIENT
Start: 2022-01-18 | End: 2022-03-21

## 2022-01-18 NOTE — PROGRESS NOTES
CC: Prenatal visit    Ginette Lara is a 31 y.o.  at 25w5d.  Doing well.  Denies contractions, LOF, or VB.  Reports good FM.  Patient has history of gastric bypass.  Cannot do Glucola.  Patient going to start fingersticks next week, will check blood sugars for 2 weeks and then bring log with her.    /66   Wt 85.3 kg (188 lb)   LMP 2021 (Exact Date)   BMI 32.27 kg/m²     Fundal Height (cm): 25 cm  Fetal Heart Rate: 140     Problems (from 21 to present)     Problem Noted Resolved    Previous bariatric surgery complicating pregnancy 2021 by Maria Del Rosario Guerra DO No    Overview Signed 2021  9:24 AM by Maria Del Rosario Guerra DO     No h/o diarrhea or concerns for malnutrition, regular balanced diet  Gastric band   Limited studies of bariatric surgery in pregnancy. Consider testing for iron, vit B12, folate, vit D, Ca if concerns for malnutrition. Decreased risk of GDM and Preeclampsia. Possible increased risk of CD. Monitor for signs of intestinal obstruction and GI hemorrhage.         History of postpartum depression 2021 by Maria Del Rosario Guerra DO No    Supervision of high risk pregnancy in second trimester 2021 by Maria Del Rosario Guerra DO No    Overview Signed 2021  9:22 AM by Maria Del Rosario Guerra DO     Plans to breastfeed  PNL reviewed: Rh+, Hgb 12.6, Plt 282  Last pap 20 NIL/HPV neg  Desires cfDNA testing, ordered for next visit         Pregnancy headache in second trimester 2021 by Maria Del Rosario Guerra DO No    Nausea and vomiting during pregnancy 2021 by Maria Del Rosario Guerra DO No    History of forceps delivery in prior pregnancy, currently pregnant 2021 by Maria Del Rosario Guerra DO No          A/P: Ginette Lara is a 31 y.o.  at 25w5d.  Supervision normal pregnancy doing well with appropriately progressing pregnancy at this time.  Fingersticks in place of glucose testing.  Bleeding and cramping precautions given.  - RTC in 2 weeks  - Reviewed COVID-19  visitation policy  - Reviewed COVID-19 precautions     Diagnosis Plan   1. 25 weeks gestation of pregnancy     2. Supervision of high risk pregnancy in second trimester     3. Pregnancy headache in second trimester     4. Nausea and vomiting during pregnancy     5. History of postpartum depression     6. History of forceps delivery in prior pregnancy, currently pregnant       Sherif Magdaleno DO  1/18/2022  16:22 CST

## 2022-02-02 ENCOUNTER — ROUTINE PRENATAL (OUTPATIENT)
Dept: OBSTETRICS AND GYNECOLOGY | Facility: CLINIC | Age: 32
End: 2022-02-02

## 2022-02-02 ENCOUNTER — LAB (OUTPATIENT)
Dept: LAB | Facility: HOSPITAL | Age: 32
End: 2022-02-02

## 2022-02-02 VITALS — SYSTOLIC BLOOD PRESSURE: 104 MMHG | BODY MASS INDEX: 32.44 KG/M2 | WEIGHT: 189 LBS | DIASTOLIC BLOOD PRESSURE: 62 MMHG

## 2022-02-02 DIAGNOSIS — O26.892 PREGNANCY HEADACHE IN SECOND TRIMESTER: ICD-10-CM

## 2022-02-02 DIAGNOSIS — Z23 NEED FOR TDAP VACCINATION: ICD-10-CM

## 2022-02-02 DIAGNOSIS — Z86.59 HISTORY OF POSTPARTUM DEPRESSION: ICD-10-CM

## 2022-02-02 DIAGNOSIS — O09.92 SUPERVISION OF HIGH RISK PREGNANCY IN SECOND TRIMESTER: ICD-10-CM

## 2022-02-02 DIAGNOSIS — R51.9 PREGNANCY HEADACHE IN SECOND TRIMESTER: ICD-10-CM

## 2022-02-02 DIAGNOSIS — O09.92 SUPERVISION OF HIGH RISK PREGNANCY IN SECOND TRIMESTER: Primary | ICD-10-CM

## 2022-02-02 DIAGNOSIS — O99.842 PREGNANCY AFFECTED BY PREVIOUS BARIATRIC SURGERY, CURRENTLY IN SECOND TRIMESTER: ICD-10-CM

## 2022-02-02 DIAGNOSIS — Z87.59 HISTORY OF POSTPARTUM DEPRESSION: ICD-10-CM

## 2022-02-02 DIAGNOSIS — O09.299 HISTORY OF FORCEPS DELIVERY IN PRIOR PREGNANCY, CURRENTLY PREGNANT: ICD-10-CM

## 2022-02-02 DIAGNOSIS — O21.9 NAUSEA AND VOMITING DURING PREGNANCY: ICD-10-CM

## 2022-02-02 DIAGNOSIS — Z3A.27 27 WEEKS GESTATION OF PREGNANCY: ICD-10-CM

## 2022-02-02 LAB
DEPRECATED RDW RBC AUTO: 37 FL (ref 37–54)
ERYTHROCYTE [DISTWIDTH] IN BLOOD BY AUTOMATED COUNT: 13.1 % (ref 12.3–15.4)
HCT VFR BLD AUTO: 30.2 % (ref 34–46.6)
HGB BLD-MCNC: 10.1 G/DL (ref 12–15.9)
MCH RBC QN AUTO: 26.9 PG (ref 26.6–33)
MCHC RBC AUTO-ENTMCNC: 33.4 G/DL (ref 31.5–35.7)
MCV RBC AUTO: 80.5 FL (ref 79–97)
PLATELET # BLD AUTO: 243 10*3/MM3 (ref 140–450)
PMV BLD AUTO: 9.4 FL (ref 6–12)
RBC # BLD AUTO: 3.75 10*6/MM3 (ref 3.77–5.28)
WBC NRBC COR # BLD: 10.97 10*3/MM3 (ref 3.4–10.8)

## 2022-02-02 PROCEDURE — 0502F SUBSEQUENT PRENATAL CARE: CPT | Performed by: OBSTETRICS & GYNECOLOGY

## 2022-02-02 PROCEDURE — 90715 TDAP VACCINE 7 YRS/> IM: CPT | Performed by: OBSTETRICS & GYNECOLOGY

## 2022-02-02 PROCEDURE — 36415 COLL VENOUS BLD VENIPUNCTURE: CPT

## 2022-02-02 PROCEDURE — 90471 IMMUNIZATION ADMIN: CPT | Performed by: OBSTETRICS & GYNECOLOGY

## 2022-02-02 PROCEDURE — 85027 COMPLETE CBC AUTOMATED: CPT

## 2022-02-02 NOTE — PROGRESS NOTES
CC: Prenatal visit    Ginette Lara is a 31 y.o.  at 27w6d.  Doing well.  Denies contractions, LOF, or VB.  Reports good FM.  She reports some hip pain and pulling on her right side.  She saw a chiropractor prior to pregnancy but not since getting pregnant.  Brought BS log; all BS less than goal -> no GDM.    /62   Wt 85.7 kg (189 lb)   LMP 2021 (Exact Date)   BMI 32.44 kg/m²   Fundal Height (cm): 29 cm  Fetal Heart Rate: 159     Problems (from 21 to present)     Problem Noted Resolved    Supervision of high risk pregnancy in second trimester 2021 by Maria Del Rosario Guerra DO No    Priority:  Medium      Overview Signed 2021  9:22 AM by Maria Del Rosario Guerra DO     Plans to breastfeed  PNL reviewed: Rh+, Hgb 12.6, Plt 282  Last pap 20 NIL/HPV neg  Desires cfDNA testing, ordered for next visit         Previous bariatric surgery complicating pregnancy 2021 by Maria DelR osario Guerra DO No    Overview Signed 2021  9:24 AM by Maria Del Rosario Guerra DO     No h/o diarrhea or concerns for malnutrition, regular balanced diet  Gastric band 2018  Limited studies of bariatric surgery in pregnancy. Consider testing for iron, vit B12, folate, vit D, Ca if concerns for malnutrition. Decreased risk of GDM and Preeclampsia. Possible increased risk of CD. Monitor for signs of intestinal obstruction and GI hemorrhage.         History of postpartum depression 2021 by Maria Del Rosario Guerra DO No    Pregnancy headache in second trimester 2021 by Maria Del Rosario Guerra DO No    Nausea and vomiting during pregnancy 2021 by Maria Del Rosario Guerra DO No    History of forceps delivery in prior pregnancy, currently pregnant 2021 by Maria Del Rosario Guerra DO No          A/P: Ginette Lara is a 31 y.o.  at 27w6d.  - RTC in 2 weeks w/ GS (h/o bariatric surgery)  - No GDM  - CBC today  - Tdap today  - Has breastpump  - Encouraged chiropractor, pregnancy support belt provided today  - Reviewed COVID-19  visitation policy  - Reviewed COVID-19 precautions     Diagnosis Plan   1. Supervision of high risk pregnancy in second trimester  CBC (No Diff)   2. Pregnancy headache in second trimester     3. Nausea and vomiting during pregnancy     4. History of postpartum depression     5. History of forceps delivery in prior pregnancy, currently pregnant     6. Pregnancy affected by previous bariatric surgery, currently in second trimester  US ob follow up transabdominal approach   7. 27 weeks gestation of pregnancy     8. Need for Tdap vaccination  Tdap Vaccine Greater Than or Equal To 8yo IM     Clarissa Carias MD  2/2/2022  15:42 CST

## 2022-02-08 PROBLEM — O99.019 ANEMIA AFFECTING PREGNANCY, ANTEPARTUM: Status: ACTIVE | Noted: 2022-02-08

## 2022-02-10 RX ORDER — DIPHENHYDRAMINE HCL 25 MG
25 CAPSULE ORAL ONCE
Status: CANCELLED | OUTPATIENT
Start: 2022-02-10

## 2022-02-10 RX ORDER — ACETAMINOPHEN 325 MG/1
650 TABLET ORAL ONCE
Status: CANCELLED | OUTPATIENT
Start: 2022-02-10

## 2022-02-10 RX ORDER — SODIUM CHLORIDE 9 MG/ML
250 INJECTION, SOLUTION INTRAVENOUS ONCE
Status: CANCELLED | OUTPATIENT
Start: 2022-02-10

## 2022-02-11 ENCOUNTER — TELEPHONE (OUTPATIENT)
Dept: OBSTETRICS AND GYNECOLOGY | Facility: CLINIC | Age: 32
End: 2022-02-11

## 2022-02-11 DIAGNOSIS — O99.019 ANEMIA AFFECTING PREGNANCY, ANTEPARTUM: Primary | ICD-10-CM

## 2022-02-11 NOTE — TELEPHONE ENCOUNTER
----- Message from Clarissa Carias MD sent at 2/11/2022 10:51 AM CST -----  Regarding: RE:  And she can do it in [place] if she wants  ----- Message -----  From: Kirsten Gaspar MA  Sent: 2/11/2022  10:48 AM CST  To: Clarissa Carias MD    Per Lily with Pre[employer]Cert department, Patient insurance [employer] is requiring patient to have an iron panel before they can approve her to have iron infusions, as they cannot go strictly by HBG level.    I can put the orders in and notify patient, do I just need to enter an Iron panel or any additional labs?    Thanks!

## 2022-02-11 NOTE — TELEPHONE ENCOUNTER
Tried calling patient, unable to reach.    Sent [place] message notifying her of need for additional labs to check iron profile. Orders entered

## 2022-02-14 ENCOUNTER — LAB (OUTPATIENT)
Dept: LAB | Facility: HOSPITAL | Age: 32
End: 2022-02-14

## 2022-02-14 ENCOUNTER — TELEPHONE (OUTPATIENT)
Dept: ONCOLOGY | Facility: HOSPITAL | Age: 32
End: 2022-02-14

## 2022-02-14 DIAGNOSIS — O99.019 ANEMIA AFFECTING PREGNANCY, ANTEPARTUM: ICD-10-CM

## 2022-02-14 PROCEDURE — 84466 ASSAY OF TRANSFERRIN: CPT

## 2022-02-14 PROCEDURE — 82728 ASSAY OF FERRITIN: CPT

## 2022-02-14 PROCEDURE — 83540 ASSAY OF IRON: CPT

## 2022-02-14 NOTE — TELEPHONE ENCOUNTER
Informed patient we needed additional labs prior to insurance approval for iron.  Patient stated understanding and that she will get labs done in [place] this afternoon.  Informed we would call her to reschedule her iron infusion.  Patient stated understanding.

## 2022-02-15 ENCOUNTER — APPOINTMENT (OUTPATIENT)
Dept: ONCOLOGY | Facility: HOSPITAL | Age: 32
End: 2022-02-15

## 2022-02-15 LAB
FERRITIN SERPL-MCNC: 6.26 NG/ML (ref 13–150)
IRON 24H UR-MRATE: 35 MCG/DL (ref 37–145)
IRON SATN MFR SERPL: 5 % (ref 20–50)
TIBC SERPL-MCNC: 715 MCG/DL (ref 298–536)
TRANSFERRIN SERPL-MCNC: 480 MG/DL (ref 200–360)

## 2022-02-17 ENCOUNTER — INFUSION (OUTPATIENT)
Dept: ONCOLOGY | Facility: HOSPITAL | Age: 32
End: 2022-02-17

## 2022-02-17 VITALS
HEART RATE: 88 BPM | DIASTOLIC BLOOD PRESSURE: 88 MMHG | SYSTOLIC BLOOD PRESSURE: 105 MMHG | RESPIRATION RATE: 18 BRPM | TEMPERATURE: 97.2 F

## 2022-02-17 DIAGNOSIS — O99.019 ANEMIA AFFECTING PREGNANCY, ANTEPARTUM: Primary | ICD-10-CM

## 2022-02-17 PROCEDURE — 25010000002 IRON SUCROSE PER 1 MG: Performed by: NURSE PRACTITIONER

## 2022-02-17 PROCEDURE — 63710000001 DIPHENHYDRAMINE PER 50 MG: Performed by: NURSE PRACTITIONER

## 2022-02-17 PROCEDURE — 96365 THER/PROPH/DIAG IV INF INIT: CPT | Performed by: NURSE PRACTITIONER

## 2022-02-17 RX ORDER — SODIUM CHLORIDE 9 MG/ML
250 INJECTION, SOLUTION INTRAVENOUS ONCE
Status: CANCELLED | OUTPATIENT
Start: 2022-02-18

## 2022-02-17 RX ORDER — SODIUM CHLORIDE 9 MG/ML
250 INJECTION, SOLUTION INTRAVENOUS ONCE
Status: COMPLETED | OUTPATIENT
Start: 2022-02-17 | End: 2022-02-17

## 2022-02-17 RX ORDER — ACETAMINOPHEN 325 MG/1
650 TABLET ORAL ONCE
Status: CANCELLED | OUTPATIENT
Start: 2022-02-18

## 2022-02-17 RX ORDER — ACETAMINOPHEN 325 MG/1
650 TABLET ORAL ONCE
Status: COMPLETED | OUTPATIENT
Start: 2022-02-17 | End: 2022-02-17

## 2022-02-17 RX ORDER — DIPHENHYDRAMINE HCL 25 MG
25 CAPSULE ORAL ONCE
Status: CANCELLED | OUTPATIENT
Start: 2022-02-18

## 2022-02-17 RX ORDER — DIPHENHYDRAMINE HCL 25 MG
25 CAPSULE ORAL ONCE
Status: COMPLETED | OUTPATIENT
Start: 2022-02-17 | End: 2022-02-17

## 2022-02-17 RX ADMIN — IRON SUCROSE 200 MG: 20 INJECTION, SOLUTION INTRAVENOUS at 14:10

## 2022-02-17 RX ADMIN — DIPHENHYDRAMINE HYDROCHLORIDE 25 MG: 25 CAPSULE ORAL at 13:39

## 2022-02-17 RX ADMIN — ACETAMINOPHEN 650 MG: 325 TABLET, FILM COATED ORAL at 13:39

## 2022-02-17 RX ADMIN — SODIUM CHLORIDE 250 ML: 9 INJECTION, SOLUTION INTRAVENOUS at 13:40

## 2022-02-18 ENCOUNTER — ROUTINE PRENATAL (OUTPATIENT)
Dept: OBSTETRICS AND GYNECOLOGY | Facility: CLINIC | Age: 32
End: 2022-02-18

## 2022-02-18 ENCOUNTER — LAB (OUTPATIENT)
Dept: LAB | Facility: HOSPITAL | Age: 32
End: 2022-02-18

## 2022-02-18 VITALS — SYSTOLIC BLOOD PRESSURE: 112 MMHG | WEIGHT: 191.2 LBS | BODY MASS INDEX: 32.82 KG/M2 | DIASTOLIC BLOOD PRESSURE: 58 MMHG

## 2022-02-18 DIAGNOSIS — O36.5931 IUGR (INTRAUTERINE GROWTH RESTRICTION) AFFECTING CARE OF MOTHER, THIRD TRIMESTER, FETUS 1: ICD-10-CM

## 2022-02-18 DIAGNOSIS — O21.9 NAUSEA AND VOMITING DURING PREGNANCY: ICD-10-CM

## 2022-02-18 DIAGNOSIS — Z86.59 HISTORY OF POSTPARTUM DEPRESSION: ICD-10-CM

## 2022-02-18 DIAGNOSIS — O09.299 HISTORY OF FORCEPS DELIVERY IN PRIOR PREGNANCY, CURRENTLY PREGNANT: ICD-10-CM

## 2022-02-18 DIAGNOSIS — O26.893 PREGNANCY HEADACHE IN THIRD TRIMESTER: ICD-10-CM

## 2022-02-18 DIAGNOSIS — R51.9 PREGNANCY HEADACHE IN THIRD TRIMESTER: ICD-10-CM

## 2022-02-18 DIAGNOSIS — O09.93 SUPERVISION OF HIGH RISK PREGNANCY IN THIRD TRIMESTER: Primary | ICD-10-CM

## 2022-02-18 DIAGNOSIS — O99.842 PREGNANCY AFFECTED BY PREVIOUS BARIATRIC SURGERY, CURRENTLY IN SECOND TRIMESTER: ICD-10-CM

## 2022-02-18 DIAGNOSIS — O36.5990 POOR FETAL GROWTH AFFECTING MANAGEMENT OF MOTHER, ANTEPARTUM, SINGLE OR UNSPECIFIED FETUS: ICD-10-CM

## 2022-02-18 DIAGNOSIS — Z3A.30 30 WEEKS GESTATION OF PREGNANCY: ICD-10-CM

## 2022-02-18 DIAGNOSIS — Z87.59 HISTORY OF POSTPARTUM DEPRESSION: ICD-10-CM

## 2022-02-18 PROCEDURE — 86694 HERPES SIMPLEX NES ANTBDY: CPT

## 2022-02-18 PROCEDURE — 86778 TOXOPLASMA ANTIBODY IGM: CPT

## 2022-02-18 PROCEDURE — 86695 HERPES SIMPLEX TYPE 1 TEST: CPT | Performed by: OBSTETRICS & GYNECOLOGY

## 2022-02-18 PROCEDURE — 86645 CMV ANTIBODY IGM: CPT

## 2022-02-18 PROCEDURE — 86777 TOXOPLASMA ANTIBODY: CPT

## 2022-02-18 PROCEDURE — 0502F SUBSEQUENT PRENATAL CARE: CPT | Performed by: OBSTETRICS & GYNECOLOGY

## 2022-02-18 PROCEDURE — 36415 COLL VENOUS BLD VENIPUNCTURE: CPT

## 2022-02-18 PROCEDURE — 86644 CMV ANTIBODY: CPT

## 2022-02-18 PROCEDURE — 86696 HERPES SIMPLEX TYPE 2 TEST: CPT | Performed by: OBSTETRICS & GYNECOLOGY

## 2022-02-18 NOTE — PROGRESS NOTES
CC: Prenatal visit    Ginette Lara is a 32 y.o.  at 30w1d.  Doing well.  Denies contractions, LOF, or VB.  Reports good FM.    /58   Wt 86.7 kg (191 lb 3.2 oz)   LMP 2021 (Exact Date)   BMI 32.82 kg/m²      Fetal Heart Rate: 143     Prelim US- EFW 1308g (8%ile) w/ AC 16%ile, FEDERICO 12.9 cm, cephalic, placenta anterior, UAD WNL     Problems (from 21 to present)     Problem Noted Resolved    Supervision of high risk pregnancy in third trimester 2021 by Maria Del Rosario Guerra DO No    Priority:  Medium      Overview Signed 2021  9:22 AM by Maria Del Rosario Guerra DO     Plans to breastfeed  PNL reviewed: Rh+, Hgb 12.6, Plt 282  Last pap 20 NIL/HPV neg  Desires cfDNA testing, ordered for next visit         IUGR (intrauterine growth restriction) affecting care of mother, third trimester, fetus 1 2022 by Clarissa Carias MD No    Overview Addendum 2022  1:11 PM by Clarissa Carias MD     NIPT low risk; TORCH titers ordered today  - EFW 1308g (8%ile) w/ AC 16%ile  GS q2-3wks starting at time of diagnosis   testing 2x/wk starting at time of diagnosis w/ UAD weekly         Previous Version    Previous bariatric surgery complicating pregnancy 2021 by Maria Del Rosario Guerra DO No    Overview Signed 2021  9:24 AM by Maria Del Rosario Guerra DO     No h/o diarrhea or concerns for malnutrition, regular balanced diet  Gastric band 2018  Limited studies of bariatric surgery in pregnancy. Consider testing for iron, vit B12, folate, vit D, Ca if concerns for malnutrition. Decreased risk of GDM and Preeclampsia. Possible increased risk of CD. Monitor for signs of intestinal obstruction and GI hemorrhage.         History of postpartum depression 2021 by Maria Del Rosario Guerra DO No    Pregnancy headache in third trimester 2021 by Maria Del Rosario Guerra DO No    Nausea and vomiting during pregnancy 2021 by Maria Del Rosario Guerra DO No    History of forceps delivery  in prior pregnancy, currently pregnant 2021 by Maria Del Rosario Guerra DO No        A/P: Ginette Lara is a 32 y.o.  at 30w1d.  - RTC in 3 days w/ BPP/UAD  - RTC in 1 week w/ NST  - TORCH titers today to evaluate potential etiology for IUGR  - IV iron infusion next week  - Reviewed COVID-19 visitation policy  - Reviewed COVID-19 precautions     Diagnosis Plan   1. Supervision of high risk pregnancy in third trimester  US fetal biophysical profile wo non stress testing   2. Pregnancy headache in third trimester     3. Nausea and vomiting during pregnancy     4. History of postpartum depression     5. History of forceps delivery in prior pregnancy, currently pregnant     6. Pregnancy affected by previous bariatric surgery, currently in second trimester     7. Poor fetal growth affecting management of mother, antepartum, single or unspecified fetus  Toxoplasma Antibodies IgG / IgM    Cytomegalovirus Antibody, IgG    Cytomegalovirus Antibody, IgM    HSV 1 & 2 - Specific Antibody, IgG    HSV Non-Specific Antibody, IgM    US fetal biophysical profile wo non stress testing    US Fetal Biophysical Profile;Without Non-Stress Testing    US Color Flow Doppler Umbilical Artery   8. 30 weeks gestation of pregnancy       Clarissa Carias MD  2022  13:11 CST

## 2022-02-19 LAB
CMV IGG SERPL IA-ACNC: <0.6 U/ML (ref 0–0.59)
CMV IGM SERPL IA-ACNC: <30 AU/ML (ref 0–29.9)
HSV1 IGG SER IA-ACNC: <0.91 INDEX (ref 0–0.9)
HSV2 IGG SER IA-ACNC: <0.91 INDEX (ref 0–0.9)
LABORATORY COMMENT REPORT: NORMAL
T GONDII IGG SERPL IA-ACNC: <3 IU/ML (ref 0–7.1)
T GONDII IGM SER IA-ACNC: <3 AU/ML (ref 0–7.9)

## 2022-02-21 ENCOUNTER — ROUTINE PRENATAL (OUTPATIENT)
Dept: OBSTETRICS AND GYNECOLOGY | Facility: CLINIC | Age: 32
End: 2022-02-21

## 2022-02-21 ENCOUNTER — HOSPITAL ENCOUNTER (OUTPATIENT)
Dept: ULTRASOUND IMAGING | Facility: HOSPITAL | Age: 32
Discharge: HOME | End: 2022-02-21
Admitting: OBSTETRICS & GYNECOLOGY

## 2022-02-21 VITALS — WEIGHT: 191.6 LBS | BODY MASS INDEX: 32.89 KG/M2 | DIASTOLIC BLOOD PRESSURE: 68 MMHG | SYSTOLIC BLOOD PRESSURE: 112 MMHG

## 2022-02-21 DIAGNOSIS — O09.93 SUPERVISION OF HIGH RISK PREGNANCY IN THIRD TRIMESTER: Primary | ICD-10-CM

## 2022-02-21 DIAGNOSIS — O36.5990 POOR FETAL GROWTH AFFECTING MANAGEMENT OF MOTHER, ANTEPARTUM, SINGLE OR UNSPECIFIED FETUS: ICD-10-CM

## 2022-02-21 DIAGNOSIS — Z87.59 HISTORY OF POSTPARTUM DEPRESSION: ICD-10-CM

## 2022-02-21 DIAGNOSIS — Z3A.30 30 WEEKS GESTATION OF PREGNANCY: ICD-10-CM

## 2022-02-21 DIAGNOSIS — O09.93 SUPERVISION OF HIGH RISK PREGNANCY IN THIRD TRIMESTER: ICD-10-CM

## 2022-02-21 DIAGNOSIS — Z86.59 HISTORY OF POSTPARTUM DEPRESSION: ICD-10-CM

## 2022-02-21 DIAGNOSIS — O21.9 NAUSEA AND VOMITING DURING PREGNANCY: ICD-10-CM

## 2022-02-21 DIAGNOSIS — R51.9 PREGNANCY HEADACHE IN THIRD TRIMESTER: ICD-10-CM

## 2022-02-21 DIAGNOSIS — O09.299 HISTORY OF FORCEPS DELIVERY IN PRIOR PREGNANCY, CURRENTLY PREGNANT: ICD-10-CM

## 2022-02-21 DIAGNOSIS — O36.5930 POOR FETAL GROWTH AFFECTING MANAGEMENT OF MOTHER IN THIRD TRIMESTER, SINGLE OR UNSPECIFIED FETUS: ICD-10-CM

## 2022-02-21 DIAGNOSIS — O26.893 PREGNANCY HEADACHE IN THIRD TRIMESTER: ICD-10-CM

## 2022-02-21 DIAGNOSIS — O99.843 PREGNANCY AFFECTED BY PREVIOUS BARIATRIC SURGERY, CURRENTLY IN THIRD TRIMESTER: ICD-10-CM

## 2022-02-21 LAB — HSV1+2 IGM SER IA-ACNC: <0.91 RATIO (ref 0–0.9)

## 2022-02-21 PROCEDURE — 0502F SUBSEQUENT PRENATAL CARE: CPT | Performed by: OBSTETRICS & GYNECOLOGY

## 2022-02-21 PROCEDURE — 76819 FETAL BIOPHYS PROFIL W/O NST: CPT

## 2022-02-21 NOTE — PROGRESS NOTES
CC: Prenatal visit    Ginette Lara is a 32 y.o.  at 30w4d.  Doing well.  Denies contractions, LOF, or VB.  Reports good FM.    /68   Wt 86.9 kg (191 lb 9.6 oz)   LMP 2021 (Exact Date)   BMI 32.89 kg/m²      Fetal Heart Rate: 146     Prelim US- BPP 8/8, FEDERICO 15.7 cm, cephalic  *UAD not performed although ordered     Problems (from 21 to present)     Problem Noted Resolved    Supervision of high risk pregnancy in third trimester 2021 by Maria Del Rosario Guerra DO No    Priority:  Medium      Overview Signed 2021  9:22 AM by Maria Del Rosario Guerra DO     Plans to breastfeed  PNL reviewed: Rh+, Hgb 12.6, Plt 282  Last pap 20 NIL/HPV neg  Desires cfDNA testing, ordered for next visit         Poor fetal growth affecting management of mother in third trimester 2022 by Clarissa Carias MD No    Overview Addendum 2022  1:11 PM by Clarissa Carias MD     NIPT low risk; TORCH titers ordered today  - EFW 1308g (8%ile) w/ AC 16%ile  GS q2-3wks starting at time of diagnosis   testing 2x/wk starting at time of diagnosis w/ UAD weekly         Previous Version    Previous bariatric surgery complicating pregnancy 2021 by Maria Del Rosario Guerra DO No    Overview Signed 2021  9:24 AM by Maria Del Rosario Guerra,      No h/o diarrhea or concerns for malnutrition, regular balanced diet  Gastric band 2018  Limited studies of bariatric surgery in pregnancy. Consider testing for iron, vit B12, folate, vit D, Ca if concerns for malnutrition. Decreased risk of GDM and Preeclampsia. Possible increased risk of CD. Monitor for signs of intestinal obstruction and GI hemorrhage.         History of postpartum depression 2021 by Maria Del Rosario Guerra DO No    Pregnancy headache in third trimester 2021 by Maria Del Rosario Guerra DO No    Nausea and vomiting during pregnancy 2021 by Maria Del Rosario Guerra DO No    History of forceps delivery in prior pregnancy, currently  pregnant 2021 by Maria Del Rosario Guerra,  No          A/P: Ginette Lara is a 32 y.o.  at 30w4d.  - RTC in 3 days w/ NST  - RTC in 1 week for BPP/UAD  - Reviewed COVID-19 visitation policy  - Reviewed COVID-19 precautions     Diagnosis Plan   1. Supervision of high risk pregnancy in third trimester     2. Poor fetal growth affecting management of mother in third trimester, single or unspecified fetus     3. Pregnancy headache in third trimester     4. Nausea and vomiting during pregnancy     5. History of postpartum depression     6. History of forceps delivery in prior pregnancy, currently pregnant     7. Pregnancy affected by previous bariatric surgery, currently in third trimester     8. 30 weeks gestation of pregnancy       Clarissa Carias MD  2022  16:29 CST

## 2022-02-22 ENCOUNTER — APPOINTMENT (OUTPATIENT)
Dept: ONCOLOGY | Facility: HOSPITAL | Age: 32
End: 2022-02-22

## 2022-02-24 ENCOUNTER — ROUTINE PRENATAL (OUTPATIENT)
Dept: OBSTETRICS AND GYNECOLOGY | Facility: CLINIC | Age: 32
End: 2022-02-24

## 2022-02-24 ENCOUNTER — INFUSION (OUTPATIENT)
Dept: ONCOLOGY | Facility: HOSPITAL | Age: 32
End: 2022-02-24

## 2022-02-24 VITALS
SYSTOLIC BLOOD PRESSURE: 97 MMHG | RESPIRATION RATE: 20 BRPM | TEMPERATURE: 98.6 F | HEART RATE: 97 BPM | DIASTOLIC BLOOD PRESSURE: 54 MMHG

## 2022-02-24 VITALS — BODY MASS INDEX: 33.3 KG/M2 | SYSTOLIC BLOOD PRESSURE: 108 MMHG | DIASTOLIC BLOOD PRESSURE: 54 MMHG | WEIGHT: 194 LBS

## 2022-02-24 DIAGNOSIS — O36.5930 POOR FETAL GROWTH AFFECTING MANAGEMENT OF MOTHER IN THIRD TRIMESTER, SINGLE OR UNSPECIFIED FETUS: ICD-10-CM

## 2022-02-24 DIAGNOSIS — O26.893 PREGNANCY HEADACHE IN THIRD TRIMESTER: ICD-10-CM

## 2022-02-24 DIAGNOSIS — Z3A.31 31 WEEKS GESTATION OF PREGNANCY: Primary | ICD-10-CM

## 2022-02-24 DIAGNOSIS — Z87.59 HISTORY OF POSTPARTUM DEPRESSION: ICD-10-CM

## 2022-02-24 DIAGNOSIS — O09.93 SUPERVISION OF HIGH RISK PREGNANCY IN THIRD TRIMESTER: ICD-10-CM

## 2022-02-24 DIAGNOSIS — O99.019 ANEMIA AFFECTING PREGNANCY, ANTEPARTUM: Primary | ICD-10-CM

## 2022-02-24 DIAGNOSIS — O09.299 HISTORY OF FORCEPS DELIVERY IN PRIOR PREGNANCY, CURRENTLY PREGNANT: ICD-10-CM

## 2022-02-24 DIAGNOSIS — R51.9 PREGNANCY HEADACHE IN THIRD TRIMESTER: ICD-10-CM

## 2022-02-24 DIAGNOSIS — Z86.59 HISTORY OF POSTPARTUM DEPRESSION: ICD-10-CM

## 2022-02-24 DIAGNOSIS — O99.843 PREGNANCY AFFECTED BY PREVIOUS BARIATRIC SURGERY, CURRENTLY IN THIRD TRIMESTER: ICD-10-CM

## 2022-02-24 DIAGNOSIS — O21.9 NAUSEA AND VOMITING DURING PREGNANCY: ICD-10-CM

## 2022-02-24 PROCEDURE — 0502F SUBSEQUENT PRENATAL CARE: CPT | Performed by: NURSE PRACTITIONER

## 2022-02-24 PROCEDURE — 96365 THER/PROPH/DIAG IV INF INIT: CPT | Performed by: NURSE PRACTITIONER

## 2022-02-24 PROCEDURE — 63710000001 DIPHENHYDRAMINE PER 50 MG: Performed by: NURSE PRACTITIONER

## 2022-02-24 PROCEDURE — 59025 FETAL NON-STRESS TEST: CPT | Performed by: NURSE PRACTITIONER

## 2022-02-24 PROCEDURE — 25010000002 IRON SUCROSE PER 1 MG: Performed by: NURSE PRACTITIONER

## 2022-02-24 RX ORDER — ACETAMINOPHEN 325 MG/1
650 TABLET ORAL ONCE
Status: CANCELLED | OUTPATIENT
Start: 2022-02-25

## 2022-02-24 RX ORDER — ACETAMINOPHEN 325 MG/1
650 TABLET ORAL ONCE
Status: COMPLETED | OUTPATIENT
Start: 2022-02-24 | End: 2022-02-24

## 2022-02-24 RX ORDER — SODIUM CHLORIDE 9 MG/ML
250 INJECTION, SOLUTION INTRAVENOUS ONCE
Status: CANCELLED | OUTPATIENT
Start: 2022-02-25

## 2022-02-24 RX ORDER — SODIUM CHLORIDE 9 MG/ML
250 INJECTION, SOLUTION INTRAVENOUS ONCE
Status: COMPLETED | OUTPATIENT
Start: 2022-02-24 | End: 2022-02-24

## 2022-02-24 RX ORDER — DIPHENHYDRAMINE HCL 25 MG
25 CAPSULE ORAL ONCE
Status: CANCELLED | OUTPATIENT
Start: 2022-02-25

## 2022-02-24 RX ORDER — DIPHENHYDRAMINE HCL 25 MG
25 CAPSULE ORAL ONCE
Status: COMPLETED | OUTPATIENT
Start: 2022-02-24 | End: 2022-02-24

## 2022-02-24 RX ADMIN — ACETAMINOPHEN 650 MG: 325 TABLET, FILM COATED ORAL at 14:11

## 2022-02-24 RX ADMIN — IRON SUCROSE 200 MG: 20 INJECTION, SOLUTION INTRAVENOUS at 14:45

## 2022-02-24 RX ADMIN — SODIUM CHLORIDE 250 ML: 9 INJECTION, SOLUTION INTRAVENOUS at 14:45

## 2022-02-24 RX ADMIN — DIPHENHYDRAMINE HYDROCHLORIDE 25 MG: 25 CAPSULE ORAL at 14:11

## 2022-02-24 NOTE — PROGRESS NOTES
CC: Prenatal visit    Ginette Lara is a 32 y.o.  at 31w0d.  Doing well.  No complaints.  Denies contractions, LOF, or VB.  Reports good FM.    /54   Wt 88 kg (194 lb)   LMP 2021 (Exact Date)   BMI 33.30 kg/m²   NST reactive: 24 minutes, 0 ctx           Problems (from 21 to present)     Problem Noted Resolved    Poor fetal growth affecting management of mother in third trimester 2022 by Clarissa Carias MD No    Overview Addendum 2022  1:11 PM by Clarissa Carias MD     NIPT low risk; TORCH titers ordered today  - EFW 1308g (8%ile) w/ AC 16%ile  GS q2-3wks starting at time of diagnosis   testing 2x/wk starting at time of diagnosis w/ UAD weekly         Previous Version    Previous bariatric surgery complicating pregnancy 2021 by Maria Del Rosario Guerra DO No    Overview Signed 2021  9:24 AM by Maria Del Rosario Guerra DO     No h/o diarrhea or concerns for malnutrition, regular balanced diet  Gastric band 2018  Limited studies of bariatric surgery in pregnancy. Consider testing for iron, vit B12, folate, vit D, Ca if concerns for malnutrition. Decreased risk of GDM and Preeclampsia. Possible increased risk of CD. Monitor for signs of intestinal obstruction and GI hemorrhage.         History of postpartum depression 2021 by Maria Del Rosario Guerra DO No    Supervision of high risk pregnancy in third trimester 2021 by Maria Del Rosario Guerra DO No    Overview Signed 2021  9:22 AM by Maria Del Rosario Guerra DO     Plans to breastfeed  PNL reviewed: Rh+, Hgb 12.6, Plt 282  Last pap 20 NIL/HPV neg  Desires cfDNA testing, ordered for next visit         Pregnancy headache in third trimester 2021 by Maria Del Rosario Guerra DO No    Nausea and vomiting during pregnancy 2021 by Maria Del Rosario Guerra DO No    History of forceps delivery in prior pregnancy, currently pregnant 2021 by Maria Del Rosario Guerra DO No          A/P: Ginette Lara is a 32  brenda  at 31w0d.  Reviewed [facility]  - RTC on 2022 for US and OB appt afterwards     Diagnosis Plan   1. 31 weeks gestation of pregnancy     2. Supervision of high risk pregnancy in third trimester     3. Poor fetal growth affecting management of mother in third trimester, single or unspecified fetus     4. Pregnancy headache in third trimester     5. Nausea and vomiting during pregnancy     6. History of postpartum depression     7. History of forceps delivery in prior pregnancy, currently pregnant     8. Pregnancy affected by previous bariatric surgery, currently in third trimester         Monalisa Lieberman, SOLEDAD  2022  13:42 CST

## 2022-02-28 ENCOUNTER — ROUTINE PRENATAL (OUTPATIENT)
Dept: OBSTETRICS AND GYNECOLOGY | Facility: CLINIC | Age: 32
End: 2022-02-28

## 2022-02-28 VITALS — WEIGHT: 191.36 LBS | SYSTOLIC BLOOD PRESSURE: 98 MMHG | DIASTOLIC BLOOD PRESSURE: 60 MMHG | BODY MASS INDEX: 32.85 KG/M2

## 2022-02-28 DIAGNOSIS — R51.9 PREGNANCY HEADACHE IN THIRD TRIMESTER: ICD-10-CM

## 2022-02-28 DIAGNOSIS — Z87.59 HISTORY OF POSTPARTUM DEPRESSION: ICD-10-CM

## 2022-02-28 DIAGNOSIS — O21.9 NAUSEA AND VOMITING DURING PREGNANCY: ICD-10-CM

## 2022-02-28 DIAGNOSIS — Z86.59 HISTORY OF POSTPARTUM DEPRESSION: ICD-10-CM

## 2022-02-28 DIAGNOSIS — Z3A.31 31 WEEKS GESTATION OF PREGNANCY: ICD-10-CM

## 2022-02-28 DIAGNOSIS — O09.93 SUPERVISION OF HIGH RISK PREGNANCY IN THIRD TRIMESTER: ICD-10-CM

## 2022-02-28 DIAGNOSIS — O09.93 SUPERVISION OF HIGH RISK PREGNANCY IN THIRD TRIMESTER: Primary | ICD-10-CM

## 2022-02-28 DIAGNOSIS — O99.843 PREGNANCY AFFECTED BY PREVIOUS BARIATRIC SURGERY, CURRENTLY IN THIRD TRIMESTER: ICD-10-CM

## 2022-02-28 DIAGNOSIS — O09.299 HISTORY OF FORCEPS DELIVERY IN PRIOR PREGNANCY, CURRENTLY PREGNANT: ICD-10-CM

## 2022-02-28 DIAGNOSIS — O36.5930 POOR FETAL GROWTH AFFECTING MANAGEMENT OF MOTHER IN THIRD TRIMESTER, SINGLE OR UNSPECIFIED FETUS: ICD-10-CM

## 2022-02-28 DIAGNOSIS — O26.893 PREGNANCY HEADACHE IN THIRD TRIMESTER: ICD-10-CM

## 2022-02-28 PROCEDURE — 0502F SUBSEQUENT PRENATAL CARE: CPT | Performed by: OBSTETRICS & GYNECOLOGY

## 2022-02-28 NOTE — PROGRESS NOTES
CC: Prenatal visit    Ginette Lara is a 32 y.o.  at 31w4d.  Doing well.  Denies contractions, LOF, or VB.  Reports good FM.  Has B/L carpal tunnel.  Has felt occasional dizziness.    BP 98/60   Wt 86.8 kg (191 lb 5.8 oz)   LMP 2021 (Exact Date)   BMI 32.85 kg/m²      Fetal Heart Rate: 154     Prelim US- BPP 8/, cephalic, placenta anterior, FEDERICO 15.6 cm, UAD WNL     Problems (from 21 to present)     Problem Noted Resolved    Supervision of high risk pregnancy in third trimester 2021 by Maria Del Rosraio Guerra DO No    Priority:  Medium      Overview Signed 2021  9:22 AM by Maria Del Rosario Guerra DO     Plans to breastfeed  PNL reviewed: Rh+, Hgb 12.6, Plt 282  Last pap 20 NIL/HPV neg  Desires cfDNA testing, ordered for next visit         Poor fetal growth affecting management of mother in third trimester 2022 by Clarissa Carias MD No    Overview Addendum 2022  1:11 PM by Clarissa Carias MD     NIPT low risk; TORCH titers ordered today  - EFW 1308g (8%ile) w/ AC 16%ile  GS q2-3wks starting at time of diagnosis   testing 2x/wk starting at time of diagnosis w/ UAD weekly         Previous Version    Previous bariatric surgery complicating pregnancy 2021 by Maria Del Rosario Guerra DO No    Overview Signed 2021  9:24 AM by Maria Del Rosario Guerra DO     No h/o diarrhea or concerns for malnutrition, regular balanced diet  Gastric band 2018  Limited studies of bariatric surgery in pregnancy. Consider testing for iron, vit B12, folate, vit D, Ca if concerns for malnutrition. Decreased risk of GDM and Preeclampsia. Possible increased risk of CD. Monitor for signs of intestinal obstruction and GI hemorrhage.         History of postpartum depression 2021 by Maria Del Rosario Guerra DO No    Pregnancy headache in third trimester 2021 by Maria Del Rosario Guerra DO No    Nausea and vomiting during pregnancy 2021 by Maria Del Rosario Guerra DO No    History of forceps  delivery in prior pregnancy, currently pregnant 2021 by Maria Del Rosario Guerra DO No          A/P: Ginette Lara is a 32 y.o.  at 31w4d.  - RTC in 3 days w/ NST  - Wrist splints B/L   - Encouraged PO hydration  - Reviewed COVID-19 visitation policy  - Reviewed COVID-19 precautions     Diagnosis Plan   1. Supervision of high risk pregnancy in third trimester     2. Poor fetal growth affecting management of mother in third trimester, single or unspecified fetus     3. Pregnancy headache in third trimester     4. Nausea and vomiting during pregnancy     5. History of postpartum depression     6. History of forceps delivery in prior pregnancy, currently pregnant     7. Pregnancy affected by previous bariatric surgery, currently in third trimester     8. 31 weeks gestation of pregnancy       Clarissa Carias MD  2022  08:38 CST

## 2022-03-03 ENCOUNTER — ROUTINE PRENATAL (OUTPATIENT)
Dept: OBSTETRICS AND GYNECOLOGY | Facility: CLINIC | Age: 32
End: 2022-03-03

## 2022-03-03 VITALS — DIASTOLIC BLOOD PRESSURE: 50 MMHG | BODY MASS INDEX: 33.06 KG/M2 | WEIGHT: 192.6 LBS | SYSTOLIC BLOOD PRESSURE: 100 MMHG

## 2022-03-03 DIAGNOSIS — O99.013 ANEMIA DURING PREGNANCY IN THIRD TRIMESTER: ICD-10-CM

## 2022-03-03 DIAGNOSIS — O09.299 HISTORY OF FORCEPS DELIVERY IN PRIOR PREGNANCY, CURRENTLY PREGNANT: ICD-10-CM

## 2022-03-03 DIAGNOSIS — O21.9 NAUSEA AND VOMITING DURING PREGNANCY: ICD-10-CM

## 2022-03-03 DIAGNOSIS — O09.93 SUPERVISION OF HIGH RISK PREGNANCY IN THIRD TRIMESTER: Primary | ICD-10-CM

## 2022-03-03 DIAGNOSIS — R51.9 PREGNANCY HEADACHE IN THIRD TRIMESTER: ICD-10-CM

## 2022-03-03 DIAGNOSIS — Z3A.32 32 WEEKS GESTATION OF PREGNANCY: ICD-10-CM

## 2022-03-03 DIAGNOSIS — Z87.59 HISTORY OF POSTPARTUM DEPRESSION: ICD-10-CM

## 2022-03-03 DIAGNOSIS — O26.893 PREGNANCY HEADACHE IN THIRD TRIMESTER: ICD-10-CM

## 2022-03-03 DIAGNOSIS — O99.843 PREGNANCY AFFECTED BY PREVIOUS BARIATRIC SURGERY, CURRENTLY IN THIRD TRIMESTER: ICD-10-CM

## 2022-03-03 DIAGNOSIS — O36.5930 POOR FETAL GROWTH AFFECTING MANAGEMENT OF MOTHER IN THIRD TRIMESTER, SINGLE OR UNSPECIFIED FETUS: ICD-10-CM

## 2022-03-03 DIAGNOSIS — Z86.59 HISTORY OF POSTPARTUM DEPRESSION: ICD-10-CM

## 2022-03-03 PROCEDURE — 59025 FETAL NON-STRESS TEST: CPT | Performed by: OBSTETRICS & GYNECOLOGY

## 2022-03-03 PROCEDURE — 0502F SUBSEQUENT PRENATAL CARE: CPT | Performed by: OBSTETRICS & GYNECOLOGY

## 2022-03-03 NOTE — PROGRESS NOTES
CC: Prenatal visit    Ginette Lara is a 32 y.o.  at 32w0d.  Doing well.  Denies contractions, LOF, or VB.  Reports good FM.    /50   Wt 87.4 kg (192 lb 9.6 oz)   LMP 2021 (Exact Date)   BMI 33.06 kg/m²      Fetal Heart Rate: 165    NST reactive     Problems (from 21 to present)     Problem Noted Resolved    Supervision of high risk pregnancy in third trimester 2021 by Maria Del Rosario Guerra DO No    Priority:  Medium      Overview Signed 2021  9:22 AM by Maria Del Rosario Guerra DO     Plans to breastfeed  PNL reviewed: Rh+, Hgb 12.6, Plt 282  Last pap 20 NIL/HPV neg  Desires cfDNA testing, ordered for next visit         Anemia during pregnancy in third trimester 3/3/2022 by Clarissa Carias MD No    Poor fetal growth affecting management of mother in third trimester 2022 by Clarissa Carias MD No    Overview Addendum 2022  1:11 PM by Clarissa Carias MD     NIPT low risk; TORCH titers ordered today  - EFW 1308g (8%ile) w/ AC 16%ile  GS q2-3wks starting at time of diagnosis   testing 2x/wk starting at time of diagnosis w/ UAD weekly         Previous Version    Previous bariatric surgery complicating pregnancy 2021 by Maria Del Rosario Guerra DO No    Overview Signed 2021  9:24 AM by Maria Del Rosario Guerra DO     No h/o diarrhea or concerns for malnutrition, regular balanced diet  Gastric band   Limited studies of bariatric surgery in pregnancy. Consider testing for iron, vit B12, folate, vit D, Ca if concerns for malnutrition. Decreased risk of GDM and Preeclampsia. Possible increased risk of CD. Monitor for signs of intestinal obstruction and GI hemorrhage.         History of postpartum depression 2021 by Maria Del Rosario Guerra DO No    Pregnancy headache in third trimester 2021 by Maria Del Rosario Guerra DO No    Nausea and vomiting during pregnancy 2021 by Maria Del Rosario Guerra DO No    History of forceps delivery in prior  pregnancy, currently pregnant 2021 by Maria Del Rosario Guerra,  No          A/P: Ginette Lara is a 32 y.o.  at 32w0d.  - RTC in 3 days w/ BPP+UAD/GS (IUGR)  - Reviewed COVID-19 visitation policy  - Reviewed COVID-19 precautions     Diagnosis Plan   1. Supervision of high risk pregnancy in third trimester     2. Poor fetal growth affecting management of mother in third trimester, single or unspecified fetus  Fetal Nonstress Test   3. Pregnancy headache in third trimester     4. Nausea and vomiting during pregnancy     5. History of postpartum depression     6. History of forceps delivery in prior pregnancy, currently pregnant     7. Pregnancy affected by previous bariatric surgery, currently in third trimester     8. Anemia during pregnancy in third trimester     9. 32 weeks gestation of pregnancy       Clarissa Carias MD  3/3/2022  08:44 CST

## 2022-03-07 ENCOUNTER — ROUTINE PRENATAL (OUTPATIENT)
Dept: OBSTETRICS AND GYNECOLOGY | Facility: CLINIC | Age: 32
End: 2022-03-07

## 2022-03-07 VITALS — BODY MASS INDEX: 33.06 KG/M2 | DIASTOLIC BLOOD PRESSURE: 52 MMHG | WEIGHT: 192.6 LBS | SYSTOLIC BLOOD PRESSURE: 98 MMHG

## 2022-03-07 DIAGNOSIS — O09.93 SUPERVISION OF HIGH RISK PREGNANCY IN THIRD TRIMESTER: Primary | ICD-10-CM

## 2022-03-07 DIAGNOSIS — Z3A.32 32 WEEKS GESTATION OF PREGNANCY: ICD-10-CM

## 2022-03-07 DIAGNOSIS — R51.9 PREGNANCY HEADACHE IN THIRD TRIMESTER: ICD-10-CM

## 2022-03-07 DIAGNOSIS — O26.893 PREGNANCY HEADACHE IN THIRD TRIMESTER: ICD-10-CM

## 2022-03-07 DIAGNOSIS — O09.299 HISTORY OF FORCEPS DELIVERY IN PRIOR PREGNANCY, CURRENTLY PREGNANT: ICD-10-CM

## 2022-03-07 DIAGNOSIS — O36.5930 POOR FETAL GROWTH AFFECTING MANAGEMENT OF MOTHER IN THIRD TRIMESTER, SINGLE OR UNSPECIFIED FETUS: ICD-10-CM

## 2022-03-07 DIAGNOSIS — Z87.59 HISTORY OF POSTPARTUM DEPRESSION: ICD-10-CM

## 2022-03-07 DIAGNOSIS — O21.9 NAUSEA AND VOMITING DURING PREGNANCY: ICD-10-CM

## 2022-03-07 DIAGNOSIS — O99.013 ANEMIA DURING PREGNANCY IN THIRD TRIMESTER: ICD-10-CM

## 2022-03-07 DIAGNOSIS — O99.019 ANEMIA AFFECTING PREGNANCY, ANTEPARTUM: ICD-10-CM

## 2022-03-07 DIAGNOSIS — O99.843 PREGNANCY AFFECTED BY PREVIOUS BARIATRIC SURGERY, CURRENTLY IN THIRD TRIMESTER: ICD-10-CM

## 2022-03-07 DIAGNOSIS — Z86.59 HISTORY OF POSTPARTUM DEPRESSION: ICD-10-CM

## 2022-03-07 PROCEDURE — 0502F SUBSEQUENT PRENATAL CARE: CPT | Performed by: OBSTETRICS & GYNECOLOGY

## 2022-03-07 NOTE — PROGRESS NOTES
CC: Prenatal visit    Ginette Lara is a 32 y.o.  at 32w4d.  Doing well.  Denies contractions, LOF, or VB.  Reports good FM.    BP 98/52   Wt 87.4 kg (192 lb 9.6 oz)   LMP 2021 (Exact Date)   BMI 33.06 kg/m²      Fetal Heart Rate: 146    Prelim US- BPP 8/8, FEDERICO 11.6 cm, UAD WNL (2.9, 2.3)     Problems (from 21 to present)     Problem Noted Resolved    Supervision of high risk pregnancy in third trimester 2021 by Maria Del Rosario Guerra DO No    Priority:  Medium      Overview Signed 2021  9:22 AM by Maria Del Rosario Guerra DO     Plans to breastfeed  PNL reviewed: Rh+, Hgb 12.6, Plt 282  Last pap 20 NIL/HPV neg  Desires cfDNA testing, ordered for next visit           Anemia during pregnancy in third trimester 3/3/2022 by Clarissa Carias MD No    Poor fetal growth affecting management of mother in third trimester 2022 by Clarissa Carias MD No    Overview Addendum 2022  1:11 PM by Clarissa Carias MD     NIPT low risk; TORCH titers ordered today  - EFW 1308g (8%ile) w/ AC 16%ile  GS q2-3wks starting at time of diagnosis   testing 2x/wk starting at time of diagnosis w/ UAD weekly           Previous Version    Previous bariatric surgery complicating pregnancy 2021 by Maria Del Rosario Guerra DO No    Overview Signed 2021  9:24 AM by Maria Del Rosario Guerra DO     No h/o diarrhea or concerns for malnutrition, regular balanced diet  Gastric band   Limited studies of bariatric surgery in pregnancy. Consider testing for iron, vit B12, folate, vit D, Ca if concerns for malnutrition. Decreased risk of GDM and Preeclampsia. Possible increased risk of CD. Monitor for signs of intestinal obstruction and GI hemorrhage.           History of postpartum depression 2021 by Maria Del Rosario Guerra DO No    Pregnancy headache in third trimester 2021 by Maria Del Rosario Guerra DO No    Nausea and vomiting during pregnancy 2021 by Maria Del Rosario Guerra DO  No    History of forceps delivery in prior pregnancy, currently pregnant 2021 by Maria Del Rosario Guerra DO No        A/P: Ginette Lara is a 32 y.o.  at 32w4d.  - RTC in 3 day w/ NST  - Reviewed COVID-19 visitation policy  - Reviewed COVID-19 precautions     Diagnosis Plan   1. Supervision of high risk pregnancy in third trimester     2. Anemia affecting pregnancy, antepartum     3. Pregnancy headache in third trimester     4. Nausea and vomiting during pregnancy     5. History of postpartum depression     6. History of forceps delivery in prior pregnancy, currently pregnant     7. Anemia during pregnancy in third trimester     8. Pregnancy affected by previous bariatric surgery, currently in third trimester     9. Poor fetal growth affecting management of mother in third trimester, single or unspecified fetus     10. 32 weeks gestation of pregnancy       Clarissa Carias MD  3/7/2022  08:40 CST

## 2022-03-10 ENCOUNTER — ROUTINE PRENATAL (OUTPATIENT)
Dept: OBSTETRICS AND GYNECOLOGY | Facility: CLINIC | Age: 32
End: 2022-03-10

## 2022-03-10 VITALS — WEIGHT: 194 LBS | DIASTOLIC BLOOD PRESSURE: 54 MMHG | BODY MASS INDEX: 33.3 KG/M2 | SYSTOLIC BLOOD PRESSURE: 100 MMHG

## 2022-03-10 DIAGNOSIS — Z3A.33 33 WEEKS GESTATION OF PREGNANCY: Primary | ICD-10-CM

## 2022-03-10 DIAGNOSIS — O09.93 SUPERVISION OF HIGH RISK PREGNANCY IN THIRD TRIMESTER: ICD-10-CM

## 2022-03-10 DIAGNOSIS — O99.013 ANEMIA DURING PREGNANCY IN THIRD TRIMESTER: ICD-10-CM

## 2022-03-10 DIAGNOSIS — R51.9 PREGNANCY HEADACHE IN THIRD TRIMESTER: ICD-10-CM

## 2022-03-10 DIAGNOSIS — O26.893 PREGNANCY HEADACHE IN THIRD TRIMESTER: ICD-10-CM

## 2022-03-10 DIAGNOSIS — O09.299 HISTORY OF FORCEPS DELIVERY IN PRIOR PREGNANCY, CURRENTLY PREGNANT: ICD-10-CM

## 2022-03-10 DIAGNOSIS — Z86.59 HISTORY OF POSTPARTUM DEPRESSION: ICD-10-CM

## 2022-03-10 DIAGNOSIS — O36.5930 POOR FETAL GROWTH AFFECTING MANAGEMENT OF MOTHER IN THIRD TRIMESTER, SINGLE OR UNSPECIFIED FETUS: ICD-10-CM

## 2022-03-10 DIAGNOSIS — O99.843 PREGNANCY AFFECTED BY PREVIOUS BARIATRIC SURGERY, CURRENTLY IN THIRD TRIMESTER: ICD-10-CM

## 2022-03-10 DIAGNOSIS — Z87.59 HISTORY OF POSTPARTUM DEPRESSION: ICD-10-CM

## 2022-03-10 DIAGNOSIS — O21.9 NAUSEA AND VOMITING DURING PREGNANCY: ICD-10-CM

## 2022-03-10 PROCEDURE — 0502F SUBSEQUENT PRENATAL CARE: CPT | Performed by: NURSE PRACTITIONER

## 2022-03-10 PROCEDURE — 59025 FETAL NON-STRESS TEST: CPT | Performed by: NURSE PRACTITIONER

## 2022-03-10 NOTE — PROGRESS NOTES
CC: Prenatal visit    Ginette Lara is a 32 y.o.  at 33w0d.  Doing well.  Patient reports swelling in feet and ankles at night time.  Denies contractions, LOF, or VB.  Reports good FM.    /54   Wt 88 kg (194 lb)   LMP 2021 (Exact Date)   BMI 33.30 kg/m²   NST: reactive, borderline minimal variability initial 20 minutes, provided pt with peanut butter crackers and water since she had not eaten, moderate variability for last 26 mins with reassuring accelerations.  0 ctx.           Problems (from 21 to present)     Problem Noted Resolved    Anemia during pregnancy in third trimester 3/3/2022 by Clarissa Carias MD No    Poor fetal growth affecting management of mother in third trimester 2022 by Clarissa Carias MD No    Overview Addendum 2022  1:11 PM by Clarissa Carias MD     NIPT low risk; TORCH titers ordered today  - EFW 1308g (8%ile) w/ AC 16%ile  GS q2-3wks starting at time of diagnosis   testing 2x/wk starting at time of diagnosis w/ UAD weekly           Previous Version    Previous bariatric surgery complicating pregnancy 2021 by Maria Del Rosario Guerra DO No    Overview Signed 2021  9:24 AM by Maria Del Rosario Guerra DO     No h/o diarrhea or concerns for malnutrition, regular balanced diet  Gastric band 2018  Limited studies of bariatric surgery in pregnancy. Consider testing for iron, vit B12, folate, vit D, Ca if concerns for malnutrition. Decreased risk of GDM and Preeclampsia. Possible increased risk of CD. Monitor for signs of intestinal obstruction and GI hemorrhage.           History of postpartum depression 2021 by Maria Del Rosario Guerra DO No    Supervision of high risk pregnancy in third trimester 2021 by Maria Del Rosario Guerra DO No    Overview Signed 2021  9:22 AM by Maria Del Rosario Guerra DO     Plans to breastfeed  PNL reviewed: Rh+, Hgb 12.6, Plt 282  Last pap 20 NIL/HPV neg  Desires cfDNA testing,  ordered for next visit           Pregnancy headache in third trimester 2021 by Maria Del Rosario Guerra DO No    Nausea and vomiting during pregnancy 2021 by Maria Del Rosario Guerra DO No    History of forceps delivery in prior pregnancy, currently pregnant 2021 by Maria Del Rosario Guerra DO No          A/P: Ginette Lara is a 32 y.o.  at 33w0d.  Discussed elevation of BLE, staying hydrated, and avoiding high sodium foods  - RTC on  for US and SHAUN appt      Diagnosis Plan   1. 33 weeks gestation of pregnancy  Fetal Nonstress Test   2. Supervision of high risk pregnancy in third trimester  Fetal Nonstress Test   3. Poor fetal growth affecting management of mother in third trimester, single or unspecified fetus  Fetal Nonstress Test   4. Pregnancy headache in third trimester  Fetal Nonstress Test   5. Nausea and vomiting during pregnancy  Fetal Nonstress Test   6. History of postpartum depression  Fetal Nonstress Test   7. History of forceps delivery in prior pregnancy, currently pregnant  Fetal Nonstress Test   8. Pregnancy affected by previous bariatric surgery, currently in third trimester  Fetal Nonstress Test   9. Anemia during pregnancy in third trimester  Fetal Nonstress Test       SOLEDAD Valentin  3/10/2022  10:15 CST

## 2022-03-14 ENCOUNTER — ROUTINE PRENATAL (OUTPATIENT)
Dept: OBSTETRICS AND GYNECOLOGY | Facility: CLINIC | Age: 32
End: 2022-03-14

## 2022-03-14 VITALS — BODY MASS INDEX: 33.23 KG/M2 | WEIGHT: 193.6 LBS | DIASTOLIC BLOOD PRESSURE: 58 MMHG | SYSTOLIC BLOOD PRESSURE: 104 MMHG

## 2022-03-14 DIAGNOSIS — O99.843 PREGNANCY AFFECTED BY PREVIOUS BARIATRIC SURGERY, CURRENTLY IN THIRD TRIMESTER: ICD-10-CM

## 2022-03-14 DIAGNOSIS — Z87.59 HISTORY OF POSTPARTUM DEPRESSION: ICD-10-CM

## 2022-03-14 DIAGNOSIS — O36.5931 POOR FETAL GROWTH AFFECTING MANAGEMENT OF MOTHER IN THIRD TRIMESTER, FETUS 1 OF MULTIPLE GESTATION: ICD-10-CM

## 2022-03-14 DIAGNOSIS — O99.013 ANEMIA DURING PREGNANCY IN THIRD TRIMESTER: ICD-10-CM

## 2022-03-14 DIAGNOSIS — Z86.59 HISTORY OF POSTPARTUM DEPRESSION: ICD-10-CM

## 2022-03-14 DIAGNOSIS — O09.299 HISTORY OF FORCEPS DELIVERY IN PRIOR PREGNANCY, CURRENTLY PREGNANT: ICD-10-CM

## 2022-03-14 DIAGNOSIS — R51.9 PREGNANCY HEADACHE IN THIRD TRIMESTER: ICD-10-CM

## 2022-03-14 DIAGNOSIS — O09.93 SUPERVISION OF HIGH RISK PREGNANCY IN THIRD TRIMESTER: Primary | ICD-10-CM

## 2022-03-14 DIAGNOSIS — O21.9 NAUSEA AND VOMITING DURING PREGNANCY: ICD-10-CM

## 2022-03-14 DIAGNOSIS — O26.893 PREGNANCY HEADACHE IN THIRD TRIMESTER: ICD-10-CM

## 2022-03-14 DIAGNOSIS — O99.019 ANEMIA AFFECTING PREGNANCY, ANTEPARTUM: ICD-10-CM

## 2022-03-14 DIAGNOSIS — Z3A.33 33 WEEKS GESTATION OF PREGNANCY: ICD-10-CM

## 2022-03-14 PROCEDURE — 0502F SUBSEQUENT PRENATAL CARE: CPT | Performed by: OBSTETRICS & GYNECOLOGY

## 2022-03-14 NOTE — PROGRESS NOTES
CC: Prenatal visit    Ginette Lara is a 32 y.o.  at 33w4d.  Doing well.  Denies contractions, LOF, or VB.  Reports good FM.    /58   Wt 87.8 kg (193 lb 9.6 oz)   LMP 2021 (Exact Date)   BMI 33.23 kg/m²      Fetal Heart Rate: 148    Prelim US- EFW 1963g (13.4%ile) w/ AC 9.5%ile, FEDERICO 17.56 cm, cephalic, placenta anterior, BPP 8/8, UAD WNL (S/D 2.6)     Problems (from 21 to present)     Problem Noted Resolved    Supervision of high risk pregnancy in third trimester 2021 by Maria Del Rosario Guerra DO No    Priority:  Medium      Overview Signed 2021  9:22 AM by Maria Del Rosario Guerra DO     Plans to breastfeed  PNL reviewed: Rh+, Hgb 12.6, Plt 282  Last pap 20 NIL/HPV neg  Desires cfDNA testing, ordered for next visit           Anemia during pregnancy in third trimester 3/3/2022 by Clarissa Carias MD No    Poor fetal growth affecting management of mother in third trimester 2022 by Clarissa Carias MD No    Overview Addendum 2022  1:11 PM by Clarissa Carias MD     NIPT low risk; TORCH titers ordered today  - EFW 1308g (8%ile) w/ AC 16%ile  GS q2-3wks starting at time of diagnosis   testing 2x/wk starting at time of diagnosis w/ UAD weekly           Previous Version    Previous bariatric surgery complicating pregnancy 2021 by Maria Del Rosario Guerra DO No    Overview Signed 2021  9:24 AM by Maria Del Rosario Guerra DO     No h/o diarrhea or concerns for malnutrition, regular balanced diet  Gastric band 2018  Limited studies of bariatric surgery in pregnancy. Consider testing for iron, vit B12, folate, vit D, Ca if concerns for malnutrition. Decreased risk of GDM and Preeclampsia. Possible increased risk of CD. Monitor for signs of intestinal obstruction and GI hemorrhage.           History of postpartum depression 2021 by Maria Del Rosario Guerra DO No    Pregnancy headache in third trimester 2021 by Maria Del Rosario Guerra, DO No     Nausea and vomiting during pregnancy 2021 by Maria Del Rosario Guerra DO No    History of forceps delivery in prior pregnancy, currently pregnant 2021 by Maria Del Rosario Guerra,  No        A/P: Ginette Lara is a 32 y.o.  at 33w4d.  - RTC in 3 days w/ NST  - RTC in 1 week w/ BPP  - Will need GS in 3 weeks  - Reviewed COVID-19 visitation policy  - Reviewed COVID-19 precautions     Diagnosis Plan   1. Supervision of high risk pregnancy in third trimester     2. Anemia affecting pregnancy, antepartum     3. Poor fetal growth affecting management of mother in third trimester, fetus 1 of multiple gestation  US Ob Follow Up Transabdominal Approach   4. Pregnancy headache in third trimester     5. Nausea and vomiting during pregnancy     6. History of postpartum depression     7. History of forceps delivery in prior pregnancy, currently pregnant     8. Pregnancy affected by previous bariatric surgery, currently in third trimester     9. Anemia during pregnancy in third trimester     10. 33 weeks gestation of pregnancy       Clarissa Carias MD  3/14/2022  12:10 CDT

## 2022-03-16 DIAGNOSIS — O26.893 PREGNANCY HEADACHE IN THIRD TRIMESTER: ICD-10-CM

## 2022-03-16 DIAGNOSIS — O36.5930 POOR FETAL GROWTH AFFECTING MANAGEMENT OF MOTHER IN THIRD TRIMESTER, SINGLE OR UNSPECIFIED FETUS: ICD-10-CM

## 2022-03-16 DIAGNOSIS — O99.013 ANEMIA DURING PREGNANCY IN THIRD TRIMESTER: ICD-10-CM

## 2022-03-16 DIAGNOSIS — Z3A.33 33 WEEKS GESTATION OF PREGNANCY: ICD-10-CM

## 2022-03-16 DIAGNOSIS — O99.843 PREGNANCY AFFECTED BY PREVIOUS BARIATRIC SURGERY, CURRENTLY IN THIRD TRIMESTER: ICD-10-CM

## 2022-03-16 DIAGNOSIS — R51.9 PREGNANCY HEADACHE IN THIRD TRIMESTER: ICD-10-CM

## 2022-03-16 DIAGNOSIS — O21.9 NAUSEA AND VOMITING DURING PREGNANCY: ICD-10-CM

## 2022-03-16 DIAGNOSIS — O09.93 SUPERVISION OF HIGH RISK PREGNANCY IN THIRD TRIMESTER: ICD-10-CM

## 2022-03-16 DIAGNOSIS — O09.299 HISTORY OF FORCEPS DELIVERY IN PRIOR PREGNANCY, CURRENTLY PREGNANT: ICD-10-CM

## 2022-03-16 DIAGNOSIS — Z86.59 HISTORY OF POSTPARTUM DEPRESSION: ICD-10-CM

## 2022-03-16 DIAGNOSIS — Z87.59 HISTORY OF POSTPARTUM DEPRESSION: ICD-10-CM

## 2022-03-17 ENCOUNTER — ROUTINE PRENATAL (OUTPATIENT)
Dept: OBSTETRICS AND GYNECOLOGY | Facility: CLINIC | Age: 32
End: 2022-03-17

## 2022-03-17 VITALS — DIASTOLIC BLOOD PRESSURE: 60 MMHG | BODY MASS INDEX: 33.92 KG/M2 | SYSTOLIC BLOOD PRESSURE: 118 MMHG | WEIGHT: 197.6 LBS

## 2022-03-17 DIAGNOSIS — O26.893 PREGNANCY HEADACHE IN THIRD TRIMESTER: ICD-10-CM

## 2022-03-17 DIAGNOSIS — O21.9 NAUSEA AND VOMITING DURING PREGNANCY: ICD-10-CM

## 2022-03-17 DIAGNOSIS — O09.93 SUPERVISION OF HIGH RISK PREGNANCY IN THIRD TRIMESTER: Primary | ICD-10-CM

## 2022-03-17 DIAGNOSIS — O99.013 ANEMIA DURING PREGNANCY IN THIRD TRIMESTER: ICD-10-CM

## 2022-03-17 DIAGNOSIS — R51.9 PREGNANCY HEADACHE IN THIRD TRIMESTER: ICD-10-CM

## 2022-03-17 DIAGNOSIS — Z86.59 HISTORY OF POSTPARTUM DEPRESSION: ICD-10-CM

## 2022-03-17 DIAGNOSIS — Z87.59 HISTORY OF POSTPARTUM DEPRESSION: ICD-10-CM

## 2022-03-17 DIAGNOSIS — Z3A.34 34 WEEKS GESTATION OF PREGNANCY: ICD-10-CM

## 2022-03-17 DIAGNOSIS — O36.5930 POOR FETAL GROWTH AFFECTING MANAGEMENT OF MOTHER IN THIRD TRIMESTER, SINGLE OR UNSPECIFIED FETUS: Primary | ICD-10-CM

## 2022-03-17 DIAGNOSIS — O36.5930 POOR FETAL GROWTH AFFECTING MANAGEMENT OF MOTHER IN THIRD TRIMESTER, SINGLE OR UNSPECIFIED FETUS: ICD-10-CM

## 2022-03-17 DIAGNOSIS — O99.840 PREVIOUS BARIATRIC SURGERY AFFECTING PREGNANCY, ANTEPARTUM: ICD-10-CM

## 2022-03-17 DIAGNOSIS — O09.299 HISTORY OF FORCEPS DELIVERY IN PRIOR PREGNANCY, CURRENTLY PREGNANT: ICD-10-CM

## 2022-03-17 PROCEDURE — 0502F SUBSEQUENT PRENATAL CARE: CPT | Performed by: OBSTETRICS & GYNECOLOGY

## 2022-03-17 PROCEDURE — 59025 FETAL NON-STRESS TEST: CPT | Performed by: OBSTETRICS & GYNECOLOGY

## 2022-03-18 NOTE — PROGRESS NOTES
CC: Prenatal visit    Ginette Lara is a 32 y.o.  at 34w1d.  Doing well.  Denies contractions, LOF, or VB.  Reports good FM.    /60   Wt 89.6 kg (197 lb 9.6 oz)   LMP 2021 (Exact Date)   BMI 33.92 kg/m²      Fetal Heart Rate: 145     NST reactive     Problems (from 21 to present)     Problem Noted Resolved    Supervision of high risk pregnancy in third trimester 2021 by Maria Del Rosario Guerra DO No    Priority:  Medium      Overview Signed 2021  9:22 AM by Maria Del Rosario Guerra DO     Plans to breastfeed  PNL reviewed: Rh+, Hgb 12.6, Plt 282  Last pap 20 NIL/HPV neg  Desires cfDNA testing, ordered for next visit           Anemia during pregnancy in third trimester 3/3/2022 by Clarissa Carias MD No    Poor fetal growth affecting management of mother in third trimester 2022 by Clarissa Carias MD No    Overview Addendum 2022  1:11 PM by Clarissa Carias MD     NIPT low risk; TORCH titers ordered today  - EFW 1308g (8%ile) w/ AC 16%ile  GS q2-3wks starting at time of diagnosis   testing 2x/wk starting at time of diagnosis w/ UAD weekly           Previous Version    Previous bariatric surgery complicating pregnancy 2021 by Maria Del Rosario Guerra DO No    Overview Signed 2021  9:24 AM by Maria Del Rosario Guerra DO     No h/o diarrhea or concerns for malnutrition, regular balanced diet  Gastric band   Limited studies of bariatric surgery in pregnancy. Consider testing for iron, vit B12, folate, vit D, Ca if concerns for malnutrition. Decreased risk of GDM and Preeclampsia. Possible increased risk of CD. Monitor for signs of intestinal obstruction and GI hemorrhage.           History of postpartum depression 2021 by Maria Del Rosario Guerra DO No    Pregnancy headache in third trimester 2021 by Maria Del Rosario Guerra DO No    Nausea and vomiting during pregnancy 2021 by Maria Del Rosario Guerra DO No    History of forceps delivery in  prior pregnancy, currently pregnant 2021 by Maria Del Rosario Guerra DO No        A/P: Ginette Lara is a 32 y.o.  at 34w1d.  - RTC in 3 days w/ BPP  - Will discuss contraception at next visit  - Discussed recommendation for IOL at 38 wks for IUGR; we will pick out a date at next visit  - Reviewed COVID-19 visitation policy  - Reviewed COVID-19 precautions     Diagnosis Plan   1. Supervision of high risk pregnancy in third trimester     2. Poor fetal growth affecting management of mother in third trimester, single or unspecified fetus     3. Pregnancy headache in third trimester     4. Nausea and vomiting during pregnancy     5. History of postpartum depression     6. History of forceps delivery in prior pregnancy, currently pregnant     7. Previous bariatric surgery affecting pregnancy, antepartum     8. Anemia during pregnancy in third trimester     9. 34 weeks gestation of pregnancy       Clarissa Carias MD  3/18/2022  16:54 CDT

## 2022-03-21 ENCOUNTER — ROUTINE PRENATAL (OUTPATIENT)
Dept: OBSTETRICS AND GYNECOLOGY | Facility: CLINIC | Age: 32
End: 2022-03-21

## 2022-03-21 VITALS — SYSTOLIC BLOOD PRESSURE: 124 MMHG | BODY MASS INDEX: 33.81 KG/M2 | DIASTOLIC BLOOD PRESSURE: 70 MMHG | WEIGHT: 197 LBS

## 2022-03-21 DIAGNOSIS — O99.013 ANEMIA DURING PREGNANCY IN THIRD TRIMESTER: ICD-10-CM

## 2022-03-21 DIAGNOSIS — Z86.59 HISTORY OF POSTPARTUM DEPRESSION: ICD-10-CM

## 2022-03-21 DIAGNOSIS — Z3A.34 34 WEEKS GESTATION OF PREGNANCY: ICD-10-CM

## 2022-03-21 DIAGNOSIS — O09.93 SUPERVISION OF HIGH RISK PREGNANCY IN THIRD TRIMESTER: Primary | ICD-10-CM

## 2022-03-21 DIAGNOSIS — O09.299 HISTORY OF FORCEPS DELIVERY IN PRIOR PREGNANCY, CURRENTLY PREGNANT: ICD-10-CM

## 2022-03-21 DIAGNOSIS — O99.843 PREGNANCY AFFECTED BY PREVIOUS BARIATRIC SURGERY, CURRENTLY IN THIRD TRIMESTER: ICD-10-CM

## 2022-03-21 DIAGNOSIS — Z87.59 HISTORY OF POSTPARTUM DEPRESSION: ICD-10-CM

## 2022-03-21 DIAGNOSIS — O21.9 NAUSEA AND VOMITING DURING PREGNANCY: ICD-10-CM

## 2022-03-21 DIAGNOSIS — O26.893 PREGNANCY HEADACHE IN THIRD TRIMESTER: ICD-10-CM

## 2022-03-21 DIAGNOSIS — O36.5930 POOR FETAL GROWTH AFFECTING MANAGEMENT OF MOTHER IN THIRD TRIMESTER, SINGLE OR UNSPECIFIED FETUS: ICD-10-CM

## 2022-03-21 DIAGNOSIS — R51.9 PREGNANCY HEADACHE IN THIRD TRIMESTER: ICD-10-CM

## 2022-03-21 PROCEDURE — 0502F SUBSEQUENT PRENATAL CARE: CPT | Performed by: OBSTETRICS & GYNECOLOGY

## 2022-03-21 RX ORDER — SODIUM CHLORIDE 0.9 % (FLUSH) 0.9 %
3-10 SYRINGE (ML) INJECTION AS NEEDED
Status: CANCELLED | OUTPATIENT
Start: 2022-03-21

## 2022-03-21 RX ORDER — ONDANSETRON 4 MG/1
4 TABLET, FILM COATED ORAL EVERY 6 HOURS PRN
Status: CANCELLED | OUTPATIENT
Start: 2022-03-21

## 2022-03-21 RX ORDER — PROMETHAZINE HYDROCHLORIDE 25 MG/1
12.5 SUPPOSITORY RECTAL EVERY 6 HOURS PRN
Status: CANCELLED | OUTPATIENT
Start: 2022-03-21

## 2022-03-21 RX ORDER — MISOPROSTOL 100 UG/1
800 TABLET ORAL AS NEEDED
Status: CANCELLED | OUTPATIENT
Start: 2022-03-21

## 2022-03-21 RX ORDER — SODIUM CHLORIDE, SODIUM LACTATE, POTASSIUM CHLORIDE, CALCIUM CHLORIDE 600; 310; 30; 20 MG/100ML; MG/100ML; MG/100ML; MG/100ML
125 INJECTION, SOLUTION INTRAVENOUS CONTINUOUS
Status: CANCELLED | OUTPATIENT
Start: 2022-03-21

## 2022-03-21 RX ORDER — OXYTOCIN 10 [USP'U]/ML
85 INJECTION, SOLUTION INTRAMUSCULAR; INTRAVENOUS ONCE
Status: CANCELLED | OUTPATIENT
Start: 2022-03-21

## 2022-03-21 RX ORDER — PROMETHAZINE HYDROCHLORIDE 25 MG/1
25 TABLET ORAL EVERY 6 HOURS PRN
Status: CANCELLED | OUTPATIENT
Start: 2022-03-21

## 2022-03-21 RX ORDER — IBUPROFEN 200 MG
800 TABLET ORAL EVERY 8 HOURS
Status: CANCELLED | OUTPATIENT
Start: 2022-03-21

## 2022-03-21 RX ORDER — OXYTOCIN 10 [USP'U]/ML
650 INJECTION, SOLUTION INTRAMUSCULAR; INTRAVENOUS ONCE
Status: CANCELLED | OUTPATIENT
Start: 2022-03-21

## 2022-03-21 RX ORDER — BUTORPHANOL TARTRATE 1 MG/ML
2 INJECTION, SOLUTION INTRAMUSCULAR; INTRAVENOUS
Status: CANCELLED | OUTPATIENT
Start: 2022-03-21

## 2022-03-21 RX ORDER — LIDOCAINE HYDROCHLORIDE 10 MG/ML
5 INJECTION, SOLUTION EPIDURAL; INFILTRATION; INTRACAUDAL; PERINEURAL AS NEEDED
Status: CANCELLED | OUTPATIENT
Start: 2022-03-21

## 2022-03-21 RX ORDER — MISOPROSTOL 100 MCG
50 TABLET ORAL EVERY 6 HOURS
Status: CANCELLED | OUTPATIENT
Start: 2022-03-21 | End: 2022-03-22

## 2022-03-21 RX ORDER — BUTORPHANOL TARTRATE 1 MG/ML
1 INJECTION, SOLUTION INTRAMUSCULAR; INTRAVENOUS
Status: CANCELLED | OUTPATIENT
Start: 2022-03-21

## 2022-03-21 RX ORDER — ACETAMINOPHEN 325 MG/1
1000 TABLET ORAL EVERY 6 HOURS
Status: CANCELLED | OUTPATIENT
Start: 2022-03-21

## 2022-03-21 RX ORDER — ONDANSETRON 2 MG/ML
4 INJECTION INTRAMUSCULAR; INTRAVENOUS EVERY 6 HOURS PRN
Status: CANCELLED | OUTPATIENT
Start: 2022-03-21

## 2022-03-21 RX ORDER — CARBOPROST TROMETHAMINE 250 UG/ML
250 INJECTION, SOLUTION INTRAMUSCULAR AS NEEDED
Status: CANCELLED | OUTPATIENT
Start: 2022-03-21

## 2022-03-21 RX ORDER — SODIUM CHLORIDE 0.9 % (FLUSH) 0.9 %
3 SYRINGE (ML) INJECTION EVERY 12 HOURS SCHEDULED
Status: CANCELLED | OUTPATIENT
Start: 2022-03-21

## 2022-03-21 RX ORDER — METHYLERGONOVINE MALEATE 0.2 MG/ML
200 INJECTION INTRAVENOUS ONCE AS NEEDED
Status: CANCELLED | OUTPATIENT
Start: 2022-03-21

## 2022-03-21 NOTE — PROGRESS NOTES
CC: Prenatal visit    Ginette Lara is a 32 y.o.  at 34w4d.  Doing well.  Denies contractions, LOF, or VB.  Reports good FM.    /70   Wt 89.4 kg (197 lb)   LMP 2021 (Exact Date)   BMI 33.81 kg/m²      Fetal Heart Rate: 148     Prelim US- BPP 8/8, FEDERICO 16.1 cm, UAD WNL, cephalic, placenta anterior     Problems (from 21 to present)     Problem Noted Resolved    Supervision of high risk pregnancy in third trimester 2021 by Maria Del Rosario Guerra DO No    Priority:  Medium      Overview Signed 2021  9:22 AM by Maria Del Rosario Guerra DO     Plans to breastfeed  PNL reviewed: Rh+, Hgb 12.6, Plt 282  Last pap 20 NIL/HPV neg  Desires cfDNA testing, ordered for next visit           Anemia during pregnancy in third trimester 3/3/2022 by Clarissa Carias MD No    Poor fetal growth affecting management of mother in third trimester 2022 by Clarissa Carias MD No    Overview Addendum 2022  1:11 PM by Clarissa Carias MD     NIPT low risk; TORCH titers ordered today  - EFW 1308g (8%ile) w/ AC 16%ile  GS q2-3wks starting at time of diagnosis   testing 2x/wk starting at time of diagnosis w/ UAD weekly           Previous Version    Previous bariatric surgery complicating pregnancy 2021 by Maria Del Rosario Guerra DO No    Overview Signed 2021  9:24 AM by Maria Del Rosario Guerra DO     No h/o diarrhea or concerns for malnutrition, regular balanced diet  Gastric band 2018  Limited studies of bariatric surgery in pregnancy. Consider testing for iron, vit B12, folate, vit D, Ca if concerns for malnutrition. Decreased risk of GDM and Preeclampsia. Possible increased risk of CD. Monitor for signs of intestinal obstruction and GI hemorrhage.           History of postpartum depression 2021 by Maria Del Rosario Guerra DO No    Pregnancy headache in third trimester 2021 by Maria Del Rosario Guerra DO No    Nausea and vomiting during pregnancy 2021 by  Maria Del Rosario Guerra DO No    History of forceps delivery in prior pregnancy, currently pregnant 2021 by Maria Del Rosario Guerra DO No        A/P: Ginette Lara is a 32 y.o.  at 34w4d.  - RTC in 3 days w/ NST  - IOL for IUGR scheduled for 4/15 at 38w1d  - Reviewed COVID-19 visitation policy  - Reviewed COVID-19 precautions     Diagnosis Plan   1. Supervision of high risk pregnancy in third trimester     2. Poor fetal growth affecting management of mother in third trimester, single or unspecified fetus     3. Pregnancy headache in third trimester     4. Nausea and vomiting during pregnancy     5. History of postpartum depression     6. History of forceps delivery in prior pregnancy, currently pregnant     7. Pregnancy affected by previous bariatric surgery, currently in third trimester     8. Anemia during pregnancy in third trimester     9. 34 weeks gestation of pregnancy       Clarissa Carias MD  3/21/2022  18:28 CDT

## 2022-03-21 NOTE — H&P
[facility]  HISTORY & PHYSICAL - Obstetrics    Name: Ginette Lara  MRN: [id number]  Location: Room/bed info not found  Date: 2022  [facility]: [id number]      CHIEF COMPLAINT:  IOL for IUGR    HISTORY OF PRESENT ILLNESS  Ginette Lara is a 32 y.o.  at 34w4d gestational age who is scheduled for IOL secondary to IUGR at 38w1d.  Today denies LOF, contractions, VB, or decreased FM.    Patient denies any chest pain, palpitations, headaches, lightheadedness, shortness of breath, cough, nausea, vomiting, diarrhea, constipation, fever, or chills.    ROS  Review of Systems   Constitutional: Negative.    HENT: Negative.    Eyes: Negative.    Respiratory: Negative.    Cardiovascular: Negative.    Gastrointestinal: Negative.    Endocrine: Negative.    Genitourinary: Negative.    Musculoskeletal: Negative.    Skin: Negative.    Allergic/Immunologic: Negative.    Neurological: Negative.    Hematological: Negative.    Psychiatric/Behavioral: Negative.      PRENATAL LAB RESULTS  Prenatal labs reviewed  External Prenatal Results     Pregnancy Outside Results - Transcribed From Office Records - See Scanned Records For Details     Test Value Date Time    ABO  O  21 0848    Rh  Positive  21 0848    Antibody Screen  Negative  21 0848    Varicella IgG       Rubella  1.74 index 21 0848    Hgb  10.1 g/dL 22 1551       12.6 g/dL 21 0848    Hct  30.2 % 22 1551       38.8 % 21 0848    Glucose Fasting GTT       Glucose Tolerance Test 1 hour       Glucose Tolerance Test 3 hour       Gonorrhea (discrete)  Negative  21 0848    Chlamydia (discrete)  Negative  21 0848    RPR  Non-Reactive  21 0848    VDRL       Syphilis Antibody       HBsAg  Non-Reactive  21 0848    Herpes Simplex Virus PCR       Herpes Simplex VIrus Culture       HIV  Non-Reactive  21 0848    Hep C RNA Quant PCR       Hep C Antibody  Non-Reactive   21 0848    AFP       Group B Strep       GBS Susceptibility to Clindamycin       GBS Susceptibility to Erythromycin       Fetal Fibronectin       Genetic Testing, Maternal Blood             Drug Screening     Test Value Date Time    Urine Drug Screen       Amphetamine Screen  Negative  21 0848    Barbiturate Screen  Negative  21 0848    Benzodiazepine Screen  Negative  21 0848    Methadone Screen  Negative  21 0848    Phencyclidine Screen  Negative  21 0848    Opiates Screen  Negative  21 0848    THC Screen  Negative  21 0848    Cocaine Screen       Propoxyphene Screen  Negative  21 0848    Buprenorphine Screen  Negative  21 0848    Methamphetamine Screen       Oxycodone Screen  Negative  21 0848    Tricyclic Antidepressants Screen  Negative  21 0848          Legend    ^: Historical                      PRENATAL RISK FACTORS   Problems (from 21 to present)     Problem Noted Resolved    Supervision of high risk pregnancy in third trimester 2021 by Maria Del Rosario Guerra DO No    Priority:  Medium      Overview Signed 2021  9:22 AM by Maria Del Rosario Guerra DO     Plans to breastfeed  PNL reviewed: Rh+, Hgb 12.6, Plt 282  Last pap 20 NIL/HPV neg  Desires cfDNA testing, ordered for next visit           Anemia during pregnancy in third trimester 3/3/2022 by Clarissa Carias MD No    Poor fetal growth affecting management of mother in third trimester 2022 by Clarissa Carias MD No    Overview Addendum 2022  1:11 PM by Clarissa Carias MD     NIPT low risk; TORCH titers ordered today  - EFW 1308g (8%ile) w/ AC 16%ile  GS q2-3wks starting at time of diagnosis   testing 2x/wk starting at time of diagnosis w/ UAD weekly           Previous Version    Previous bariatric surgery complicating pregnancy 2021 by Maria Del Rosario Guerra DO No    Overview Signed 2021  9:24 AM by Charlie  DO Maria Del Rosario     No h/o diarrhea or concerns for malnutrition, regular balanced diet  Gastric band   Limited studies of bariatric surgery in pregnancy. Consider testing for iron, vit B12, folate, vit D, Ca if concerns for malnutrition. Decreased risk of GDM and Preeclampsia. Possible increased risk of CD. Monitor for signs of intestinal obstruction and GI hemorrhage.           History of postpartum depression 2021 by Maria Del Rosario Guerra DO No    Pregnancy headache in third trimester 2021 by Maria Del Rosario Guerra DO No    Nausea and vomiting during pregnancy 2021 by Maria Del Rosario Guerra DO No    History of forceps delivery in prior pregnancy, currently pregnant 2021 by Maria Del Rosario Guerra DO No        OB HISTORY  OB History    Para Term  AB Living   2 1 1     1   SAB IAB Ectopic Molar Multiple Live Births             1      # Outcome Date GA Lbr Lorne/2nd Weight Sex Delivery Anes PTL Lv   2 Current            1 Term 10/21/15 39w5d  3345 g (7 lb 6 oz) M Vag-Forceps EPI N JENNY      Birth Comments: ELECTIVE IOL      GYN HISTORY  Denies h/o sexually transmitted infections/pelvic inflammatory disease  Denies h/o gynecologic surgeries, including biopsies of the cervix    PAST MEDICAL HISTORY  Past Medical History:   Diagnosis Date   • History of postpartum depression    • Varicella      PAST SURGICAL HISTORY  Past Surgical History:   Procedure Laterality Date   • GASTRIC SLEEVE LAPAROSCOPIC     • MANDIBLE FRACTURE SURGERY     • MOUTH SURGERY N/A    • WISDOM TOOTH EXTRACTION       FAMILY HISTORY  Family History   Problem Relation Age of Onset   • Hypertension Father    • Kidney disease Mother    • No Known Problems Sister    • No Known Problems Son    • Pneumonia Maternal Grandmother    • Diabetes Maternal Grandfather    • Hypertension Maternal Grandfather    • No Known Problems Paternal Grandmother    • Lymphoma Paternal Grandfather         Non-Hodgkin's    • Ovarian cancer Neg Hx    • Uterine cancer Neg Hx     • Breast cancer Neg Hx    • Colon cancer Neg Hx    • Cervical cancer Neg Hx      SOCIAL HISTORY  Social History     Socioeconomic History   • Marital status:    Tobacco Use   • Smoking status: Never Smoker   • Smokeless tobacco: Never Used   Substance and Sexual Activity   • Alcohol use: Never   • Drug use: Never   • Sexual activity: Yes     Partners: Male     Comment: last pap smear 20 negative      ALLERGIES  Allergies   Allergen Reactions   • Amoxicillin-Pot Clavulanate Rash     HOME MEDICATIONS  Prior to Admission medications    Medication Sig Start Date End Date Taking? Authorizing Provider   cyclobenzaprine (FLEXERIL) 10 MG tablet Take 1 tablet by mouth 3 (Three) Times a Day As Needed (headache). 21 Yes Clarissa Carias MD   doxylamine (UNISOM) 25 MG tablet Take 1 tablet by mouth At Night As Needed for Nausea. 21  Yes Maria Del Rosario Guerra DO   magnesium oxide (MAG-OX) 400 MG tablet Take 1 tablet by mouth Daily. 21  Yes Maria Del Rosario Guerra DO   multivitamin with minerals tablet tablet Take 1 tablet by mouth Daily.   Yes ProviderKamaljit MD   ondansetron (Zofran) 4 MG tablet Take 1 tablet by mouth Daily As Needed for Nausea or Vomiting. 21 Yes Clarissa Carias MD   prenatal vitamin (prenatal, CLASSIC, vitamin) tablet Take 1 tablet by mouth Daily.   Yes ProviderKamaljit MD   vitamin B-6 (PYRIDOXINE) 25 MG tablet Take 1 tablet by mouth 3 (Three) Times a Day. 21  Yes Maria Del Rosario Guerra DO     PHYSICAL EXAM  /70   Wt 89.4 kg (197 lb)   LMP 2021 (Exact Date)   BMI 33.81 kg/m²   General: No acute distress. Well developed, well nourished. Pleasant.  Heart: Regular rate and rhythm. No murmurs, rubs, or gallops  Lungs: Clear to auscultation bilaterally. No wheezes, rales, or rhonchi.  Abdomen: Soft, nontender to palpation, enlarged by gravid uterus.    IMPRESSION  Ginette Lara is a 32 y.o.  scheduled for IOL  at 38w1d secondary to IUGR.    PLAN  1.  IUP at 38w1d with IUGR  - Admit: Labor and Delivery  - Attending: Dr. Carias  - Condition: Stable  - Vitals: per protocol  - Activity: ad michelle  - Nursing: Continuous electronic fetal monitoring, as per protocol  - Diet: Clears  - IV fluids:  mL/hr  - Meds: SL Cytotec  - Allergies: Augmentin  - Labs: CBC, T&S, UDS  - GBS: pending.  Antibiotics: pending  - Ginette Lara and I have discussed pain goals for this hospitalization after reviewing her current clinical condition, medical history and prior pain experiences.  The goal is to keep her pain level appropriate.  Patient does desire an epidural  - Anticipate .    This document has been electronically signed by Clarissa Carias MD on 2022 18:32 CDT.

## 2022-03-24 ENCOUNTER — ROUTINE PRENATAL (OUTPATIENT)
Dept: OBSTETRICS AND GYNECOLOGY | Facility: CLINIC | Age: 32
End: 2022-03-24

## 2022-03-24 VITALS — BODY MASS INDEX: 34.33 KG/M2 | WEIGHT: 200 LBS | SYSTOLIC BLOOD PRESSURE: 124 MMHG | DIASTOLIC BLOOD PRESSURE: 62 MMHG

## 2022-03-24 DIAGNOSIS — O36.5931 POOR FETAL GROWTH AFFECTING MANAGEMENT OF MOTHER IN THIRD TRIMESTER, FETUS 1 OF MULTIPLE GESTATION: ICD-10-CM

## 2022-03-24 DIAGNOSIS — O99.843 PREGNANCY AFFECTED BY PREVIOUS BARIATRIC SURGERY, CURRENTLY IN THIRD TRIMESTER: ICD-10-CM

## 2022-03-24 DIAGNOSIS — Z3A.35 35 WEEKS GESTATION OF PREGNANCY: Primary | ICD-10-CM

## 2022-03-24 DIAGNOSIS — R51.9 PREGNANCY HEADACHE IN THIRD TRIMESTER: ICD-10-CM

## 2022-03-24 DIAGNOSIS — O09.299 HISTORY OF FORCEPS DELIVERY IN PRIOR PREGNANCY, CURRENTLY PREGNANT: ICD-10-CM

## 2022-03-24 DIAGNOSIS — O26.893 PREGNANCY HEADACHE IN THIRD TRIMESTER: ICD-10-CM

## 2022-03-24 DIAGNOSIS — Z87.59 HISTORY OF POSTPARTUM DEPRESSION: ICD-10-CM

## 2022-03-24 DIAGNOSIS — O09.93 SUPERVISION OF HIGH RISK PREGNANCY IN THIRD TRIMESTER: ICD-10-CM

## 2022-03-24 DIAGNOSIS — O99.013 ANEMIA DURING PREGNANCY IN THIRD TRIMESTER: ICD-10-CM

## 2022-03-24 DIAGNOSIS — O21.9 NAUSEA AND VOMITING DURING PREGNANCY: ICD-10-CM

## 2022-03-24 DIAGNOSIS — Z86.59 HISTORY OF POSTPARTUM DEPRESSION: ICD-10-CM

## 2022-03-24 PROCEDURE — 59025 FETAL NON-STRESS TEST: CPT | Performed by: NURSE PRACTITIONER

## 2022-03-24 PROCEDURE — 0502F SUBSEQUENT PRENATAL CARE: CPT | Performed by: NURSE PRACTITIONER

## 2022-03-24 NOTE — PROGRESS NOTES
CC: Prenatal visit    Ginette Lara is a 32 y.o.  at 35w0d.  Doing well.  No complaints.  Denies contractions, LOF, or VB.  Reports good FM.    /62   Wt 90.7 kg (200 lb)   LMP 2021 (Exact Date)   BMI 34.33 kg/m²     Fetal NST- reactive, 28 minutes, 0 ctx            Problems (from 21 to present)     Problem Noted Resolved    Anemia during pregnancy in third trimester 3/3/2022 by Clarissa Carias MD No    Poor fetal growth affecting management of mother in third trimester 2022 by Clarissa Carias MD No    Overview Addendum 2022  1:11 PM by Clarissa Carias MD     NIPT low risk; TORCH titers ordered today  - EFW 1308g (8%ile) w/ AC 16%ile  GS q2-3wks starting at time of diagnosis   testing 2x/wk starting at time of diagnosis w/ UAD weekly           Previous Version    Previous bariatric surgery complicating pregnancy 2021 by Maria Del Rosario Guerra,  No    Overview Signed 2021  9:24 AM by Maria Del Rosario Guerra DO     No h/o diarrhea or concerns for malnutrition, regular balanced diet  Gastric band 2018  Limited studies of bariatric surgery in pregnancy. Consider testing for iron, vit B12, folate, vit D, Ca if concerns for malnutrition. Decreased risk of GDM and Preeclampsia. Possible increased risk of CD. Monitor for signs of intestinal obstruction and GI hemorrhage.           History of postpartum depression 2021 by Maria Del Rosario Guerra DO No    Supervision of high risk pregnancy in third trimester 2021 by Maria Del Rosario Guerra DO No    Overview Signed 2021  9:22 AM by Maria Del Rosario Guerra DO     Plans to breastfeed  PNL reviewed: Rh+, Hgb 12.6, Plt 282  Last pap 20 NIL/HPV neg  Desires cfDNA testing, ordered for next visit           Pregnancy headache in third trimester 2021 by Maria Del Rosario Guerra DO No    Nausea and vomiting during pregnancy 2021 by Maria Del Rosario Guerra DO No    History of forceps delivery in prior  pregnancy, currently pregnant 2021 by Maria Del Rosario Guerra DO No          A/P: Ginette Lara is a 32 y.o.  at 35w0d.  - RT on 2022     Diagnosis Plan   1. 35 weeks gestation of pregnancy  Fetal Nonstress Test   2. Supervision of high risk pregnancy in third trimester  Fetal Nonstress Test   3. Poor fetal growth affecting management of mother in third trimester, fetus 1 of multiple gestation  Fetal Nonstress Test   4. Pregnancy headache in third trimester  Fetal Nonstress Test   5. Nausea and vomiting during pregnancy  Fetal Nonstress Test   6. History of postpartum depression  Fetal Nonstress Test   7. History of forceps delivery in prior pregnancy, currently pregnant  Fetal Nonstress Test   8. Pregnancy affected by previous bariatric surgery, currently in third trimester  Fetal Nonstress Test   9. Anemia during pregnancy in third trimester  Fetal Nonstress Test       SOLEDAD Valentin  3/24/2022  09:24 CDT

## 2022-03-28 ENCOUNTER — ROUTINE PRENATAL (OUTPATIENT)
Dept: OBSTETRICS AND GYNECOLOGY | Facility: CLINIC | Age: 32
End: 2022-03-28

## 2022-03-28 VITALS — SYSTOLIC BLOOD PRESSURE: 120 MMHG | WEIGHT: 197 LBS | BODY MASS INDEX: 33.81 KG/M2 | DIASTOLIC BLOOD PRESSURE: 70 MMHG

## 2022-03-28 DIAGNOSIS — O36.5930 POOR FETAL GROWTH AFFECTING MANAGEMENT OF MOTHER IN THIRD TRIMESTER, SINGLE OR UNSPECIFIED FETUS: ICD-10-CM

## 2022-03-28 DIAGNOSIS — Z87.59 HISTORY OF POSTPARTUM DEPRESSION: ICD-10-CM

## 2022-03-28 DIAGNOSIS — O99.013 ANEMIA DURING PREGNANCY IN THIRD TRIMESTER: ICD-10-CM

## 2022-03-28 DIAGNOSIS — O26.893 PREGNANCY HEADACHE IN THIRD TRIMESTER: ICD-10-CM

## 2022-03-28 DIAGNOSIS — O09.299 HISTORY OF FORCEPS DELIVERY IN PRIOR PREGNANCY, CURRENTLY PREGNANT: ICD-10-CM

## 2022-03-28 DIAGNOSIS — O99.843 PREGNANCY AFFECTED BY PREVIOUS BARIATRIC SURGERY, CURRENTLY IN THIRD TRIMESTER: ICD-10-CM

## 2022-03-28 DIAGNOSIS — O09.93 SUPERVISION OF HIGH RISK PREGNANCY IN THIRD TRIMESTER: ICD-10-CM

## 2022-03-28 DIAGNOSIS — O21.9 NAUSEA AND VOMITING DURING PREGNANCY: ICD-10-CM

## 2022-03-28 DIAGNOSIS — R51.9 PREGNANCY HEADACHE IN THIRD TRIMESTER: ICD-10-CM

## 2022-03-28 DIAGNOSIS — O36.5931 POOR FETAL GROWTH AFFECTING MANAGEMENT OF MOTHER IN THIRD TRIMESTER, FETUS 1 OF MULTIPLE GESTATION: ICD-10-CM

## 2022-03-28 DIAGNOSIS — Z3A.35 35 WEEKS GESTATION OF PREGNANCY: ICD-10-CM

## 2022-03-28 DIAGNOSIS — Z86.59 HISTORY OF POSTPARTUM DEPRESSION: ICD-10-CM

## 2022-03-28 DIAGNOSIS — O09.93 SUPERVISION OF HIGH RISK PREGNANCY IN THIRD TRIMESTER: Primary | ICD-10-CM

## 2022-03-28 PROCEDURE — 0502F SUBSEQUENT PRENATAL CARE: CPT | Performed by: OBSTETRICS & GYNECOLOGY

## 2022-03-28 PROCEDURE — 87653 STREP B DNA AMP PROBE: CPT | Performed by: OBSTETRICS & GYNECOLOGY

## 2022-03-28 NOTE — PROGRESS NOTES
CC: Prenatal visit    Ginette Lara is a 32 y.o.  at 35w4d.  Doing well.  Denies contractions, LOF, or VB.  Reports good FM.    /70   Wt 89.4 kg (197 lb)   LMP 2021 (Exact Date)   BMI 33.81 kg/m²   SVE: FT/50/-3/soft/posterior     Fetal Heart Rate: 146     Prelim US- EFW 2668g (43%ile) w/ AC 36%ile, FEDERICO 17.4 cm, cephalic, placenta anterior, BPP 8/8, UAD WNL     Problems (from 21 to present)     Problem Noted Resolved    Supervision of high risk pregnancy in third trimester 2021 by Maria Del Rosario Guerra DO No    Priority:  Medium      Overview Signed 2021  9:22 AM by Maria Del Rosario Guerra DO     Plans to breastfeed  PNL reviewed: Rh+, Hgb 12.6, Plt 282  Last pap 20 NIL/HPV neg  Desires cfDNA testing, ordered for next visit           Anemia during pregnancy in third trimester 3/3/2022 by Clarissa Carias MD No    Poor fetal growth affecting management of mother in third trimester 2022 by Clarissa Carias MD No    Overview Addendum 3/28/2022  2:13 PM by Clarissa Carias MD     RESOLVED  NIPT low risk; TORCH titers WNL  - EFW 1308g (8%ile) w/ AC 16%ile  3/28- EFW 2668g (43%ile) w/ AC 36%ile           Previous Version    Previous bariatric surgery complicating pregnancy 2021 by Maria Del Rosario Guerra DO No    Overview Signed 2021  9:24 AM by Maria Del Rosario Guerra DO     No h/o diarrhea or concerns for malnutrition, regular balanced diet  Gastric band 2018  Limited studies of bariatric surgery in pregnancy. Consider testing for iron, vit B12, folate, vit D, Ca if concerns for malnutrition. Decreased risk of GDM and Preeclampsia. Possible increased risk of CD. Monitor for signs of intestinal obstruction and GI hemorrhage.           History of postpartum depression 2021 by Maria Del Rosario Guerra DO No    Pregnancy headache in third trimester 2021 by Guerra, Maria Del Rosario, DO No    Nausea and vomiting during pregnancy 2021 by Charlie,  DO Maria Del Rosario No    History of forceps delivery in prior pregnancy, currently pregnant 2021 by Maria Del Rosario Guerra DO No        A/P: Ginette Lara is a 32 y.o.  at 35w4d.  - RTC in 1 week  - Discussed no longer IUGR and no longer indicated for 38 week delivery.  Discussed delivery at 39-41 weeks.  She will consider and let us know.  - Reviewed COVID-19 visitation policy  - Reviewed COVID-19 precautions     Diagnosis Plan   1. Supervision of high risk pregnancy in third trimester     2. Poor fetal growth affecting management of mother in third trimester, single or unspecified fetus     3. Pregnancy headache in third trimester     4. Nausea and vomiting during pregnancy     5. History of postpartum depression     6. History of forceps delivery in prior pregnancy, currently pregnant     7. Pregnancy affected by previous bariatric surgery, currently in third trimester     8. Anemia during pregnancy in third trimester     9. 35 weeks gestation of pregnancy  Group B Strep (Molecular) - Swab, Vaginal/Rectum    Group B Strep (Molecular) - Swab, Vaginal/Rectum     Clarissa Carias MD  3/28/2022  14:14 CDT

## 2022-03-29 LAB — GROUP B STREP, DNA: NEGATIVE

## 2022-04-07 ENCOUNTER — ROUTINE PRENATAL (OUTPATIENT)
Dept: OBSTETRICS AND GYNECOLOGY | Facility: CLINIC | Age: 32
End: 2022-04-07

## 2022-04-07 VITALS — BODY MASS INDEX: 33.99 KG/M2 | WEIGHT: 198 LBS | SYSTOLIC BLOOD PRESSURE: 112 MMHG | DIASTOLIC BLOOD PRESSURE: 60 MMHG

## 2022-04-07 DIAGNOSIS — Z86.59 HISTORY OF POSTPARTUM DEPRESSION: ICD-10-CM

## 2022-04-07 DIAGNOSIS — O09.299 HISTORY OF FORCEPS DELIVERY IN PRIOR PREGNANCY, CURRENTLY PREGNANT: ICD-10-CM

## 2022-04-07 DIAGNOSIS — Z87.59 HISTORY OF POSTPARTUM DEPRESSION: ICD-10-CM

## 2022-04-07 DIAGNOSIS — O09.93 SUPERVISION OF HIGH RISK PREGNANCY IN THIRD TRIMESTER: Primary | ICD-10-CM

## 2022-04-07 DIAGNOSIS — R51.9 PREGNANCY HEADACHE IN THIRD TRIMESTER: ICD-10-CM

## 2022-04-07 DIAGNOSIS — O99.843 PREGNANCY AFFECTED BY PREVIOUS BARIATRIC SURGERY, CURRENTLY IN THIRD TRIMESTER: ICD-10-CM

## 2022-04-07 DIAGNOSIS — O26.893 PREGNANCY HEADACHE IN THIRD TRIMESTER: ICD-10-CM

## 2022-04-07 DIAGNOSIS — Z3A.37 37 WEEKS GESTATION OF PREGNANCY: ICD-10-CM

## 2022-04-07 DIAGNOSIS — O99.013 ANEMIA DURING PREGNANCY IN THIRD TRIMESTER: ICD-10-CM

## 2022-04-07 DIAGNOSIS — O36.5930 POOR FETAL GROWTH AFFECTING MANAGEMENT OF MOTHER IN THIRD TRIMESTER, SINGLE OR UNSPECIFIED FETUS: ICD-10-CM

## 2022-04-07 DIAGNOSIS — O21.9 NAUSEA AND VOMITING DURING PREGNANCY: ICD-10-CM

## 2022-04-07 PROCEDURE — 0502F SUBSEQUENT PRENATAL CARE: CPT | Performed by: OBSTETRICS & GYNECOLOGY

## 2022-04-07 NOTE — PROGRESS NOTES
CC: Prenatal visit    Ginette Lara is a 32 y.o.  at 37w0d.  Doing well.  Denies contractions, LOF, or VB.  Reports good FM.    /60   Wt 89.8 kg (198 lb)   LMP 2021 (Exact Date)   BMI 33.99 kg/m²   SVE: Declined     Fetal Heart Rate: 150s     Problems (from 21 to present)     Problem Noted Resolved    Supervision of high risk pregnancy in third trimester 2021 by Maria Del Rosario Guerra DO No    Priority:  Medium      Overview Signed 2021  9:22 AM by Maria Del Rosario Guerra DO     Plans to breastfeed  PNL reviewed: Rh+, Hgb 12.6, Plt 282  Last pap 20 NIL/HPV neg  Desires cfDNA testing, ordered for next visit           Anemia during pregnancy in third trimester 3/3/2022 by Clarissa Carias MD No    Poor fetal growth affecting management of mother in third trimester 2022 by Clarissa Carias MD No    Overview Addendum 3/28/2022  2:13 PM by Clarissa Carias MD     RESOLVED  NIPT low risk; TORCH titers WNL  - EFW 1308g (8%ile) w/ AC 16%ile  3/28- EFW 2668g (43%ile) w/ AC 36%ile           Previous Version    Previous bariatric surgery complicating pregnancy 2021 by Maria Del Rosario Guerra,  No    Overview Signed 2021  9:24 AM by Maria Del Rosario Guerra,      No h/o diarrhea or concerns for malnutrition, regular balanced diet  Gastric band 2018  Limited studies of bariatric surgery in pregnancy. Consider testing for iron, vit B12, folate, vit D, Ca if concerns for malnutrition. Decreased risk of GDM and Preeclampsia. Possible increased risk of CD. Monitor for signs of intestinal obstruction and GI hemorrhage.           History of postpartum depression 2021 by Maria Del Rosario Guerra DO No    Pregnancy headache in third trimester 2021 by Maria Del Rosario Guerra DO No    Nausea and vomiting during pregnancy 2021 by Maria Del Rosario Guerra DO No    History of forceps delivery in prior pregnancy, currently pregnant 2021 by Maria Del Rosario Guerra,  No         A/P: Ginette Lara is a 32 y.o.  at 37w0d.  - RTC in 1 week  - Desires EIOL; scheduled for 39 wks  - Reviewed COVID-19 visitation policy  - Reviewed COVID-19 precautions     Diagnosis Plan   1. Supervision of high risk pregnancy in third trimester     2. Poor fetal growth affecting management of mother in third trimester, single or unspecified fetus     3. Pregnancy headache in third trimester     4. Nausea and vomiting during pregnancy     5. History of postpartum depression     6. History of forceps delivery in prior pregnancy, currently pregnant     7. Pregnancy affected by previous bariatric surgery, currently in third trimester  Fetal Nonstress Test   8. Anemia during pregnancy in third trimester     9. 37 weeks gestation of pregnancy       Clarissa Carias MD  2022  09:43 CDT

## 2022-04-12 ENCOUNTER — ROUTINE PRENATAL (OUTPATIENT)
Dept: OBSTETRICS AND GYNECOLOGY | Facility: CLINIC | Age: 32
End: 2022-04-12

## 2022-04-12 VITALS — DIASTOLIC BLOOD PRESSURE: 64 MMHG | BODY MASS INDEX: 33.99 KG/M2 | SYSTOLIC BLOOD PRESSURE: 108 MMHG | WEIGHT: 198 LBS

## 2022-04-12 DIAGNOSIS — Z87.59 HISTORY OF POSTPARTUM DEPRESSION: ICD-10-CM

## 2022-04-12 DIAGNOSIS — O09.299 HISTORY OF FORCEPS DELIVERY IN PRIOR PREGNANCY, CURRENTLY PREGNANT: ICD-10-CM

## 2022-04-12 DIAGNOSIS — R51.9 PREGNANCY HEADACHE IN THIRD TRIMESTER: ICD-10-CM

## 2022-04-12 DIAGNOSIS — O21.9 NAUSEA AND VOMITING DURING PREGNANCY: ICD-10-CM

## 2022-04-12 DIAGNOSIS — O26.893 PREGNANCY HEADACHE IN THIRD TRIMESTER: ICD-10-CM

## 2022-04-12 DIAGNOSIS — Z86.59 HISTORY OF POSTPARTUM DEPRESSION: ICD-10-CM

## 2022-04-12 DIAGNOSIS — O99.843 PREGNANCY AFFECTED BY PREVIOUS BARIATRIC SURGERY, CURRENTLY IN THIRD TRIMESTER: ICD-10-CM

## 2022-04-12 DIAGNOSIS — Z3A.37 37 WEEKS GESTATION OF PREGNANCY: ICD-10-CM

## 2022-04-12 DIAGNOSIS — O99.013 ANEMIA DURING PREGNANCY IN THIRD TRIMESTER: ICD-10-CM

## 2022-04-12 DIAGNOSIS — O09.93 SUPERVISION OF HIGH RISK PREGNANCY IN THIRD TRIMESTER: Primary | ICD-10-CM

## 2022-04-12 PROCEDURE — 0502F SUBSEQUENT PRENATAL CARE: CPT | Performed by: OBSTETRICS & GYNECOLOGY

## 2022-04-12 NOTE — PROGRESS NOTES
CC: Prenatal visit    Ginette Lara is a 32 y.o.  at 37w5d.  Doing well.  Denies contractions, LOF, or VB.  Reports good FM.    /64   Wt 89.8 kg (198 lb)   LMP 2021 (Exact Date)   BMI 33.99 kg/m²   SVE: 1/thick/high/med/posterior, vertex  Fundal Height (cm): 36 cm  Fetal Heart Rate: 138     Problems (from 21 to present)     Problem Noted Resolved    Supervision of high risk pregnancy in third trimester 2021 by Maria Del Rosario Guerra DO No    Priority:  Medium      Overview Signed 2021  9:22 AM by Maria Del Rosario Guerra DO     Plans to breastfeed  PNL reviewed: Rh+, Hgb 12.6, Plt 282  Last pap 20 NIL/HPV neg  Desires cfDNA testing, ordered for next visit           Anemia during pregnancy in third trimester 3/3/2022 by Clarissa Carias MD No    Poor fetal growth affecting management of mother in third trimester 2022 by Clarissa Carias MD No    Overview Addendum 3/28/2022  2:13 PM by Clarissa Carias MD     RESOLVED  NIPT low risk; TORCH titers WNL  - EFW 1308g (8%ile) w/ AC 16%ile  3/28- EFW 2668g (43%ile) w/ AC 36%ile           Previous Version    Previous bariatric surgery complicating pregnancy 2021 by Maria Del Rosario Guerra DO No    Overview Signed 2021  9:24 AM by Maria Del Rosario Guerra DO     No h/o diarrhea or concerns for malnutrition, regular balanced diet  Gastric band 2018  Limited studies of bariatric surgery in pregnancy. Consider testing for iron, vit B12, folate, vit D, Ca if concerns for malnutrition. Decreased risk of GDM and Preeclampsia. Possible increased risk of CD. Monitor for signs of intestinal obstruction and GI hemorrhage.           History of postpartum depression 2021 by Maria Del Rosario Guerra DO No    Pregnancy headache in third trimester 2021 by Maria Del Rosario Guerra DO No    Nausea and vomiting during pregnancy 2021 by Maria Del Rosario Guerra DO No    History of forceps delivery in prior pregnancy, currently  pregnant 2021 by Maria Del Rosario Guerra,  No        A/P: Ginette Lara is a 32 y.o.  at 37w5d.  - RTC in 1 week  - EIOL scheduled , Cytotec  - Reviewed COVID-19 visitation policy  - Reviewed COVID-19 precautions     Diagnosis Plan   1. Supervision of high risk pregnancy in third trimester     2. Pregnancy headache in third trimester     3. Nausea and vomiting during pregnancy     4. History of postpartum depression     5. History of forceps delivery in prior pregnancy, currently pregnant     6. Pregnancy affected by previous bariatric surgery, currently in third trimester     7. Anemia during pregnancy in third trimester     8. 37 weeks gestation of pregnancy       Clarissa Carias MD  2022  14:30 CDT

## 2022-04-19 ENCOUNTER — ROUTINE PRENATAL (OUTPATIENT)
Dept: OBSTETRICS AND GYNECOLOGY | Facility: CLINIC | Age: 32
End: 2022-04-19

## 2022-04-19 VITALS — DIASTOLIC BLOOD PRESSURE: 64 MMHG | BODY MASS INDEX: 33.99 KG/M2 | SYSTOLIC BLOOD PRESSURE: 102 MMHG | WEIGHT: 198 LBS

## 2022-04-19 DIAGNOSIS — O09.93 SUPERVISION OF HIGH RISK PREGNANCY IN THIRD TRIMESTER: Primary | ICD-10-CM

## 2022-04-19 DIAGNOSIS — O36.5930 POOR FETAL GROWTH AFFECTING MANAGEMENT OF MOTHER IN THIRD TRIMESTER, SINGLE OR UNSPECIFIED FETUS: ICD-10-CM

## 2022-04-19 DIAGNOSIS — R51.9 PREGNANCY HEADACHE IN THIRD TRIMESTER: ICD-10-CM

## 2022-04-19 DIAGNOSIS — O09.299 HISTORY OF FORCEPS DELIVERY IN PRIOR PREGNANCY, CURRENTLY PREGNANT: ICD-10-CM

## 2022-04-19 DIAGNOSIS — O99.843 PREGNANCY AFFECTED BY PREVIOUS BARIATRIC SURGERY, CURRENTLY IN THIRD TRIMESTER: ICD-10-CM

## 2022-04-19 DIAGNOSIS — Z86.59 HISTORY OF POSTPARTUM DEPRESSION: ICD-10-CM

## 2022-04-19 DIAGNOSIS — O26.893 PREGNANCY HEADACHE IN THIRD TRIMESTER: ICD-10-CM

## 2022-04-19 DIAGNOSIS — Z3A.38 38 WEEKS GESTATION OF PREGNANCY: ICD-10-CM

## 2022-04-19 DIAGNOSIS — O99.013 ANEMIA DURING PREGNANCY IN THIRD TRIMESTER: ICD-10-CM

## 2022-04-19 DIAGNOSIS — Z87.59 HISTORY OF POSTPARTUM DEPRESSION: ICD-10-CM

## 2022-04-19 DIAGNOSIS — O21.9 NAUSEA AND VOMITING DURING PREGNANCY: ICD-10-CM

## 2022-04-19 PROCEDURE — 0502F SUBSEQUENT PRENATAL CARE: CPT | Performed by: OBSTETRICS & GYNECOLOGY

## 2022-04-20 NOTE — PROGRESS NOTES
CC: Prenatal visit    Ginette Lara is a 32 y.o.  at 38w6d.  Doing well.  Denies contractions, LOF, or VB.  Reports good FM.    /64   Wt 89.8 kg (198 lb)   LMP 2021 (Exact Date)   BMI 33.99 kg/m²   SVE: Declined  Fundal Height (cm): 36 cm  Fetal Heart Rate: 144     Problems (from 21 to present)     Problem Noted Resolved    Supervision of high risk pregnancy in third trimester 2021 by Maria Del Rosario Guerra DO No    Priority:  Medium      Overview Signed 2021  9:22 AM by Maria Del Rosario Guerra DO     Plans to breastfeed  PNL reviewed: Rh+, Hgb 12.6, Plt 282  Last pap 20 NIL/HPV neg  Desires cfDNA testing, ordered for next visit           Anemia during pregnancy in third trimester 3/3/2022 by Clarissa Carias MD No    Poor fetal growth affecting management of mother in third trimester 2022 by Clarissa Carias MD No    Overview Addendum 3/28/2022  2:13 PM by Clarissa Carias MD     RESOLVED  NIPT low risk; TORCH titers WNL  - EFW 1308g (8%ile) w/ AC 16%ile  3/28- EFW 2668g (43%ile) w/ AC 36%ile           Previous Version    Previous bariatric surgery complicating pregnancy 2021 by Maria Del Rosario Guerra DO No    Overview Signed 2021  9:24 AM by Maria Del Rosario Guerra DO     No h/o diarrhea or concerns for malnutrition, regular balanced diet  Gastric band 2018  Limited studies of bariatric surgery in pregnancy. Consider testing for iron, vit B12, folate, vit D, Ca if concerns for malnutrition. Decreased risk of GDM and Preeclampsia. Possible increased risk of CD. Monitor for signs of intestinal obstruction and GI hemorrhage.           History of postpartum depression 2021 by Maria Del Rosario Guerra DO No    Pregnancy headache in third trimester 2021 by Maria Del Rosario Guerra DO No    Nausea and vomiting during pregnancy 2021 by Maria Del Rosario Guerra DO No    History of forceps delivery in prior pregnancy, currently pregnant 2021 by Charlie  Thank you for clarification regarding chantix, I reviewed this and it is correct if more than 8 weeks passed patient should restart starting dose.  New script sent.  Also script for phentermine sent but advise patient that she will need to be seen every 3 months for refill of this as it is a controlled medication.  Scripts sent    Candida Truong MD     Maria Del Rosario,  No        A/P: Ginette Lara is a 32 y.o.  at 38w6d.  - EIOL scheduled for  at 39w0d  - Reviewed COVID-19 visitation policy  - Reviewed COVID-19 precautions     Diagnosis Plan   1. Supervision of high risk pregnancy in third trimester     2. Poor fetal growth affecting management of mother in third trimester, single or unspecified fetus     3. Pregnancy headache in third trimester     4. Nausea and vomiting during pregnancy     5. History of postpartum depression     6. History of forceps delivery in prior pregnancy, currently pregnant     7. Pregnancy affected by previous bariatric surgery, currently in third trimester     8. Anemia during pregnancy in third trimester     9. 38 weeks gestation of pregnancy       Clarissa Carias MD  2022  17:58 CDT

## 2022-04-21 ENCOUNTER — ANESTHESIA EVENT (OUTPATIENT)
Dept: LABOR AND DELIVERY | Facility: HOSPITAL | Age: 32
End: 2022-04-21

## 2022-04-21 ENCOUNTER — HOSPITAL ENCOUNTER (INPATIENT)
Dept: LABOR AND DELIVERY | Facility: HOSPITAL | Age: 32
Discharge: HOME | End: 2022-04-21

## 2022-04-21 ENCOUNTER — HOSPITAL ENCOUNTER (INPATIENT)
Facility: HOSPITAL | Age: 32
LOS: 2 days | Discharge: HOME | End: 2022-04-23
Attending: OBSTETRICS & GYNECOLOGY | Admitting: OBSTETRICS & GYNECOLOGY

## 2022-04-21 ENCOUNTER — ANESTHESIA (OUTPATIENT)
Dept: LABOR AND DELIVERY | Facility: HOSPITAL | Age: 32
End: 2022-04-21

## 2022-04-21 DIAGNOSIS — O99.843 PREGNANCY AFFECTED BY PREVIOUS BARIATRIC SURGERY, CURRENTLY IN THIRD TRIMESTER: ICD-10-CM

## 2022-04-21 DIAGNOSIS — O36.5930 POOR FETAL GROWTH AFFECTING MANAGEMENT OF MOTHER IN THIRD TRIMESTER, SINGLE OR UNSPECIFIED FETUS: ICD-10-CM

## 2022-04-21 PROBLEM — O36.5990 IUGR (INTRAUTERINE GROWTH RESTRICTION) AFFECTING CARE OF MOTHER: Status: RESOLVED | Noted: 2022-04-21 | Resolved: 2022-04-21

## 2022-04-21 PROBLEM — O36.5990 IUGR (INTRAUTERINE GROWTH RESTRICTION) AFFECTING CARE OF MOTHER: Status: ACTIVE | Noted: 2022-04-21

## 2022-04-21 PROBLEM — Z34.90 ENCOUNTER FOR ELECTIVE INDUCTION OF LABOR: Status: ACTIVE | Noted: 2022-04-21

## 2022-04-21 LAB
ABO GROUP BLD: NORMAL
AMPHET+METHAMPHET UR QL: NEGATIVE
AMPHETAMINES UR QL: NEGATIVE
BARBITURATES UR QL SCN: NEGATIVE
BENZODIAZ UR QL SCN: NEGATIVE
BLD GP AB SCN SERPL QL: NEGATIVE
BUPRENORPHINE SERPL-MCNC: NEGATIVE NG/ML
CANNABINOIDS SERPL QL: NEGATIVE
COCAINE UR QL: NEGATIVE
DEPRECATED RDW RBC AUTO: 40.8 FL (ref 37–54)
ERYTHROCYTE [DISTWIDTH] IN BLOOD BY AUTOMATED COUNT: 14.6 % (ref 12.3–15.4)
FLUAV RNA RESP QL NAA+PROBE: NOT DETECTED
FLUBV RNA RESP QL NAA+PROBE: NOT DETECTED
HCT VFR BLD AUTO: 31.1 % (ref 34–46.6)
HGB BLD-MCNC: 10.3 G/DL (ref 12–15.9)
Lab: NORMAL
MCH RBC QN AUTO: 25.6 PG (ref 26.6–33)
MCHC RBC AUTO-ENTMCNC: 33.1 G/DL (ref 31.5–35.7)
MCV RBC AUTO: 77.2 FL (ref 79–97)
METHADONE UR QL SCN: NEGATIVE
OPIATES UR QL: NEGATIVE
OXYCODONE UR QL SCN: NEGATIVE
PCP UR QL SCN: NEGATIVE
PLATELET # BLD AUTO: 257 10*3/MM3 (ref 140–450)
PMV BLD AUTO: 10.2 FL (ref 6–12)
PROPOXYPH UR QL: NEGATIVE
RBC # BLD AUTO: 4.03 10*6/MM3 (ref 3.77–5.28)
RH BLD: POSITIVE
SARS-COV-2 RNA RESP QL NAA+PROBE: NOT DETECTED
T&S EXPIRATION DATE: NORMAL
TRICYCLICS UR QL SCN: NEGATIVE
WBC NRBC COR # BLD: 10.35 10*3/MM3 (ref 3.4–10.8)

## 2022-04-21 PROCEDURE — 86850 RBC ANTIBODY SCREEN: CPT | Performed by: OBSTETRICS & GYNECOLOGY

## 2022-04-21 PROCEDURE — 86901 BLOOD TYPING SEROLOGIC RH(D): CPT | Performed by: OBSTETRICS & GYNECOLOGY

## 2022-04-21 PROCEDURE — C1755 CATHETER, INTRASPINAL: HCPCS

## 2022-04-21 PROCEDURE — S0260 H&P FOR SURGERY: HCPCS | Performed by: OBSTETRICS & GYNECOLOGY

## 2022-04-21 PROCEDURE — 51703 INSERT BLADDER CATH COMPLEX: CPT

## 2022-04-21 PROCEDURE — 59400 OBSTETRICAL CARE: CPT | Performed by: OBSTETRICS & GYNECOLOGY

## 2022-04-21 PROCEDURE — 85027 COMPLETE CBC AUTOMATED: CPT | Performed by: OBSTETRICS & GYNECOLOGY

## 2022-04-21 PROCEDURE — 63710000001 PROMETHAZINE PER 25 MG: Performed by: OBSTETRICS & GYNECOLOGY

## 2022-04-21 PROCEDURE — 87636 SARSCOV2 & INF A&B AMP PRB: CPT | Performed by: OBSTETRICS & GYNECOLOGY

## 2022-04-21 PROCEDURE — 86900 BLOOD TYPING SEROLOGIC ABO: CPT | Performed by: OBSTETRICS & GYNECOLOGY

## 2022-04-21 PROCEDURE — 25010000002 ONDANSETRON PER 1 MG: Performed by: OBSTETRICS & GYNECOLOGY

## 2022-04-21 PROCEDURE — 0HQ9XZZ REPAIR PERINEUM SKIN, EXTERNAL APPROACH: ICD-10-PCS | Performed by: OBSTETRICS & GYNECOLOGY

## 2022-04-21 PROCEDURE — 80306 DRUG TEST PRSMV INSTRMNT: CPT | Performed by: OBSTETRICS & GYNECOLOGY

## 2022-04-21 PROCEDURE — 0UQG7ZZ REPAIR VAGINA, VIA NATURAL OR ARTIFICIAL OPENING: ICD-10-PCS | Performed by: OBSTETRICS & GYNECOLOGY

## 2022-04-21 PROCEDURE — 3E033VJ INTRODUCTION OF OTHER HORMONE INTO PERIPHERAL VEIN, PERCUTANEOUS APPROACH: ICD-10-PCS | Performed by: OBSTETRICS & GYNECOLOGY

## 2022-04-21 PROCEDURE — 25010000002 FENTANYL CITRATE (PF) 100 MCG/2ML SOLUTION: Performed by: NURSE ANESTHETIST, CERTIFIED REGISTERED

## 2022-04-21 PROCEDURE — C1755 CATHETER, INTRASPINAL: HCPCS | Performed by: NURSE ANESTHETIST, CERTIFIED REGISTERED

## 2022-04-21 PROCEDURE — 25010000002 BUTORPHANOL PER 1 MG: Performed by: OBSTETRICS & GYNECOLOGY

## 2022-04-21 RX ORDER — SODIUM CHLORIDE, SODIUM LACTATE, POTASSIUM CHLORIDE, CALCIUM CHLORIDE 600; 310; 30; 20 MG/100ML; MG/100ML; MG/100ML; MG/100ML
INJECTION, SOLUTION INTRAVENOUS
Status: COMPLETED
Start: 2022-04-21 | End: 2022-04-21

## 2022-04-21 RX ORDER — METHYLERGONOVINE MALEATE 0.2 MG/ML
200 INJECTION INTRAVENOUS ONCE AS NEEDED
Status: DISCONTINUED | OUTPATIENT
Start: 2022-04-21 | End: 2022-04-22 | Stop reason: HOSPADM

## 2022-04-21 RX ORDER — BUTORPHANOL TARTRATE 1 MG/ML
1 INJECTION, SOLUTION INTRAMUSCULAR; INTRAVENOUS
Status: DISCONTINUED | OUTPATIENT
Start: 2022-04-21 | End: 2022-04-22 | Stop reason: HOSPADM

## 2022-04-21 RX ORDER — LIDOCAINE HYDROCHLORIDE 10 MG/ML
5 INJECTION, SOLUTION EPIDURAL; INFILTRATION; INTRACAUDAL; PERINEURAL AS NEEDED
Status: DISCONTINUED | OUTPATIENT
Start: 2022-04-21 | End: 2022-04-22 | Stop reason: HOSPADM

## 2022-04-21 RX ORDER — SODIUM CHLORIDE 0.9 % (FLUSH) 0.9 %
3 SYRINGE (ML) INJECTION EVERY 12 HOURS SCHEDULED
Status: DISCONTINUED | OUTPATIENT
Start: 2022-04-21 | End: 2022-04-22 | Stop reason: HOSPADM

## 2022-04-21 RX ORDER — ACETAMINOPHEN 500 MG
1000 TABLET ORAL EVERY 6 HOURS
Status: DISCONTINUED | OUTPATIENT
Start: 2022-04-21 | End: 2022-04-22 | Stop reason: HOSPADM

## 2022-04-21 RX ORDER — MISOPROSTOL 200 UG/1
800 TABLET ORAL AS NEEDED
Status: DISCONTINUED | OUTPATIENT
Start: 2022-04-21 | End: 2022-04-22 | Stop reason: HOSPADM

## 2022-04-21 RX ORDER — OXYTOCIN/0.9 % SODIUM CHLORIDE 30/500 ML
85 PLASTIC BAG, INJECTION (ML) INTRAVENOUS ONCE
Status: DISCONTINUED | OUTPATIENT
Start: 2022-04-21 | End: 2022-04-22 | Stop reason: HOSPADM

## 2022-04-21 RX ORDER — ONDANSETRON 2 MG/ML
4 INJECTION INTRAMUSCULAR; INTRAVENOUS EVERY 6 HOURS PRN
Status: DISCONTINUED | OUTPATIENT
Start: 2022-04-21 | End: 2022-04-22 | Stop reason: HOSPADM

## 2022-04-21 RX ORDER — SODIUM CHLORIDE, SODIUM LACTATE, POTASSIUM CHLORIDE, CALCIUM CHLORIDE 600; 310; 30; 20 MG/100ML; MG/100ML; MG/100ML; MG/100ML
125 INJECTION, SOLUTION INTRAVENOUS CONTINUOUS
Status: DISCONTINUED | OUTPATIENT
Start: 2022-04-21 | End: 2022-04-22

## 2022-04-21 RX ORDER — OXYTOCIN/0.9 % SODIUM CHLORIDE 30/500 ML
2 PLASTIC BAG, INJECTION (ML) INTRAVENOUS
Status: DISCONTINUED | OUTPATIENT
Start: 2022-04-21 | End: 2022-04-22

## 2022-04-21 RX ORDER — MISOPROSTOL 100 MCG
50 TABLET ORAL EVERY 6 HOURS
Status: DISCONTINUED | OUTPATIENT
Start: 2022-04-21 | End: 2022-04-21

## 2022-04-21 RX ORDER — PROMETHAZINE HYDROCHLORIDE 12.5 MG/1
12.5 SUPPOSITORY RECTAL EVERY 6 HOURS PRN
Status: DISCONTINUED | OUTPATIENT
Start: 2022-04-21 | End: 2022-04-22 | Stop reason: HOSPADM

## 2022-04-21 RX ORDER — FENTANYL CITRATE 50 UG/ML
INJECTION, SOLUTION INTRAMUSCULAR; INTRAVENOUS AS NEEDED
Status: DISCONTINUED | OUTPATIENT
Start: 2022-04-21 | End: 2022-04-21 | Stop reason: SURG

## 2022-04-21 RX ORDER — SODIUM CHLORIDE 0.9 % (FLUSH) 0.9 %
3-10 SYRINGE (ML) INJECTION AS NEEDED
Status: DISCONTINUED | OUTPATIENT
Start: 2022-04-21 | End: 2022-04-22 | Stop reason: HOSPADM

## 2022-04-21 RX ORDER — PROMETHAZINE HYDROCHLORIDE 25 MG/1
25 TABLET ORAL EVERY 6 HOURS PRN
Status: DISCONTINUED | OUTPATIENT
Start: 2022-04-21 | End: 2022-04-22 | Stop reason: HOSPADM

## 2022-04-21 RX ORDER — BUPIVACAINE HYDROCHLORIDE 2.5 MG/ML
INJECTION, SOLUTION EPIDURAL; INFILTRATION; INTRACAUDAL AS NEEDED
Status: DISCONTINUED | OUTPATIENT
Start: 2022-04-21 | End: 2022-04-21 | Stop reason: SURG

## 2022-04-21 RX ORDER — CARBOPROST TROMETHAMINE 250 UG/ML
250 INJECTION, SOLUTION INTRAMUSCULAR AS NEEDED
Status: DISCONTINUED | OUTPATIENT
Start: 2022-04-21 | End: 2022-04-22 | Stop reason: HOSPADM

## 2022-04-21 RX ORDER — ONDANSETRON 4 MG/1
4 TABLET, FILM COATED ORAL EVERY 6 HOURS PRN
Status: DISCONTINUED | OUTPATIENT
Start: 2022-04-21 | End: 2022-04-22 | Stop reason: HOSPADM

## 2022-04-21 RX ORDER — IBUPROFEN 800 MG/1
800 TABLET ORAL EVERY 8 HOURS
Status: DISCONTINUED | OUTPATIENT
Start: 2022-04-22 | End: 2022-04-22 | Stop reason: HOSPADM

## 2022-04-21 RX ORDER — OXYTOCIN/0.9 % SODIUM CHLORIDE 30/500 ML
PLASTIC BAG, INJECTION (ML) INTRAVENOUS
Status: COMPLETED
Start: 2022-04-21 | End: 2022-04-21

## 2022-04-21 RX ORDER — OXYTOCIN/0.9 % SODIUM CHLORIDE 30/500 ML
650 PLASTIC BAG, INJECTION (ML) INTRAVENOUS ONCE
Status: COMPLETED | OUTPATIENT
Start: 2022-04-21 | End: 2022-04-21

## 2022-04-21 RX ADMIN — SODIUM CHLORIDE, POTASSIUM CHLORIDE, SODIUM LACTATE AND CALCIUM CHLORIDE 999 ML/HR: 600; 310; 30; 20 INJECTION, SOLUTION INTRAVENOUS at 13:11

## 2022-04-21 RX ADMIN — ONDANSETRON 4 MG: 2 INJECTION INTRAMUSCULAR; INTRAVENOUS at 19:57

## 2022-04-21 RX ADMIN — SODIUM CHLORIDE, POTASSIUM CHLORIDE, SODIUM LACTATE AND CALCIUM CHLORIDE 125 ML/HR: 600; 310; 30; 20 INJECTION, SOLUTION INTRAVENOUS at 22:36

## 2022-04-21 RX ADMIN — ONDANSETRON 4 MG: 2 INJECTION INTRAMUSCULAR; INTRAVENOUS at 14:28

## 2022-04-21 RX ADMIN — BUTORPHANOL TARTRATE 1 MG: 1 INJECTION, SOLUTION INTRAMUSCULAR; INTRAVENOUS at 12:02

## 2022-04-21 RX ADMIN — MISOPROSTOL 50 MCG: 100 TABLET ORAL at 06:33

## 2022-04-21 RX ADMIN — FENTANYL CITRATE 200 MCG: 50 INJECTION, SOLUTION INTRAMUSCULAR; INTRAVENOUS at 13:56

## 2022-04-21 RX ADMIN — OXYTOCIN-SODIUM CHLORIDE 0.9% IV SOLN 30 UNIT/500ML 2 MILLI-UNITS/MIN: 30-0.9/5 SOLUTION at 11:55

## 2022-04-21 RX ADMIN — SODIUM CHLORIDE, POTASSIUM CHLORIDE, SODIUM LACTATE AND CALCIUM CHLORIDE 125 ML/HR: 600; 310; 30; 20 INJECTION, SOLUTION INTRAVENOUS at 06:34

## 2022-04-21 RX ADMIN — Medication 10 ML/HR: at 13:56

## 2022-04-21 RX ADMIN — OXYTOCIN-SODIUM CHLORIDE 0.9% IV SOLN 30 UNIT/500ML 650 ML/HR: 30-0.9/5 SOLUTION at 22:35

## 2022-04-21 RX ADMIN — SODIUM CHLORIDE, POTASSIUM CHLORIDE, SODIUM LACTATE AND CALCIUM CHLORIDE 125 ML/HR: 600; 310; 30; 20 INJECTION, SOLUTION INTRAVENOUS at 16:03

## 2022-04-21 RX ADMIN — BUPIVACAINE HYDROCHLORIDE 10 ML: 2.5 INJECTION, SOLUTION EPIDURAL; INFILTRATION; INTRACAUDAL; PERINEURAL at 21:25

## 2022-04-21 RX ADMIN — Medication 2 MILLI-UNITS/MIN: at 11:55

## 2022-04-21 RX ADMIN — PROMETHAZINE HYDROCHLORIDE 25 MG: 25 TABLET ORAL at 12:02

## 2022-04-21 NOTE — ANESTHESIA PREPROCEDURE EVALUATION
Anesthesia Evaluation     NPO Solid Status: > 4 hours  NPO Liquid Status: > 2 hours           Airway   Mallampati: II  TM distance: >3 FB  Neck ROM: full  No difficulty expected  Dental - normal exam     Pulmonary - negative pulmonary ROS and normal exam   Cardiovascular - negative cardio ROS and normal exam        Neuro/Psych  (+) headaches,    GI/Hepatic/Renal/Endo - negative ROS     Musculoskeletal     Abdominal    Substance History - negative use     OB/GYN    (+) Pregnant,         Other - negative ROS                       Anesthesia Plan    ASA 2     epidural       Anesthetic plan, all risks, benefits, and alternatives have been provided, discussed and informed consent has been obtained with: patient.        CODE STATUS:    Code Status (Patient has no pulse and is not breathing): CPR (Attempt to Resuscitate)  Medical Interventions (Patient has pulse or is breathing): Full

## 2022-04-21 NOTE — ANESTHESIA PROCEDURE NOTES
Labor Epidural      Patient reassessed immediately prior to procedure    Patient location during procedure: OB  Start Time: 4/21/2022 1:23 PM  Stop Time: 4/21/2022 1:56 PM  Performed By  CRNA: Alka Al CRNA  Preanesthetic Checklist  Completed: patient identified, IV checked, site marked, risks and benefits discussed, surgical consent, monitors and equipment checked, pre-op evaluation and timeout performed  Additional Notes  Correct patient identified  Correct procedure identified  Negative aspiration   Negative test dose after administration  Prep:  Pt Position:sitting  Sterile Tech:cap, gloves, mask and sterile barrier  Prep:povidone-iodine 7.5% surgical scrub  Monitoring:blood pressure monitoring, EKG and continuous pulse oximetry  Epidural Block Procedure:  Approach:midline  Guidance:landmark technique  Location:L3-L4  Needle Type:Tuohy  Needle Gauge:17 G  Loss of Resistance Medium: saline  Loss of Resistance: 7cm  Cath Depth at skin:12 cm  Paresthesia: none  Aspiration:negative  Test Dose:negative (Test dose given at 1344)  Med administered at 4/21/2022 1:44 PM  Number of Attempts: 1  Post Assessment:  Dressing:occlusive dressing applied and secured with tape  Pt Tolerance:patient tolerated the procedure well with no apparent complications  Complications:no

## 2022-04-22 PROCEDURE — 0503F POSTPARTUM CARE VISIT: CPT | Performed by: OBSTETRICS & GYNECOLOGY

## 2022-04-22 RX ORDER — SODIUM CHLORIDE 0.9 % (FLUSH) 0.9 %
1-10 SYRINGE (ML) INJECTION AS NEEDED
Status: DISCONTINUED | OUTPATIENT
Start: 2022-04-22 | End: 2022-04-23 | Stop reason: HOSPADM

## 2022-04-22 RX ORDER — HYDROCORTISONE 25 MG/G
1 CREAM TOPICAL AS NEEDED
Status: DISCONTINUED | OUTPATIENT
Start: 2022-04-22 | End: 2022-04-23 | Stop reason: HOSPADM

## 2022-04-22 RX ORDER — IBUPROFEN 800 MG/1
800 TABLET ORAL EVERY 8 HOURS
Status: DISCONTINUED | OUTPATIENT
Start: 2022-04-22 | End: 2022-04-23 | Stop reason: HOSPADM

## 2022-04-22 RX ORDER — DOCUSATE SODIUM 100 MG/1
100 CAPSULE, LIQUID FILLED ORAL 2 TIMES DAILY
Status: DISCONTINUED | OUTPATIENT
Start: 2022-04-22 | End: 2022-04-23 | Stop reason: HOSPADM

## 2022-04-22 RX ORDER — OXYTOCIN/0.9 % SODIUM CHLORIDE 30/500 ML
650 PLASTIC BAG, INJECTION (ML) INTRAVENOUS ONCE
Status: DISCONTINUED | OUTPATIENT
Start: 2022-04-22 | End: 2022-04-23 | Stop reason: HOSPADM

## 2022-04-22 RX ORDER — ONDANSETRON 4 MG/1
4 TABLET, FILM COATED ORAL EVERY 6 HOURS PRN
Status: DISCONTINUED | OUTPATIENT
Start: 2022-04-22 | End: 2022-04-23 | Stop reason: HOSPADM

## 2022-04-22 RX ORDER — ONDANSETRON 2 MG/ML
4 INJECTION INTRAMUSCULAR; INTRAVENOUS EVERY 6 HOURS PRN
Status: DISCONTINUED | OUTPATIENT
Start: 2022-04-22 | End: 2022-04-23 | Stop reason: HOSPADM

## 2022-04-22 RX ORDER — CALCIUM CARBONATE 200(500)MG
2 TABLET,CHEWABLE ORAL 3 TIMES DAILY PRN
Status: DISCONTINUED | OUTPATIENT
Start: 2022-04-22 | End: 2022-04-23 | Stop reason: HOSPADM

## 2022-04-22 RX ORDER — BISACODYL 10 MG
10 SUPPOSITORY, RECTAL RECTAL DAILY PRN
Status: DISCONTINUED | OUTPATIENT
Start: 2022-04-22 | End: 2022-04-23 | Stop reason: HOSPADM

## 2022-04-22 RX ORDER — PRENATAL VIT/IRON FUM/FOLIC AC 27MG-0.8MG
1 TABLET ORAL DAILY
Status: DISCONTINUED | OUTPATIENT
Start: 2022-04-22 | End: 2022-04-23 | Stop reason: HOSPADM

## 2022-04-22 RX ORDER — FERROUS SULFATE TAB EC 324 MG (65 MG FE EQUIVALENT) 324 (65 FE) MG
324 TABLET DELAYED RESPONSE ORAL 2 TIMES DAILY WITH MEALS
Status: DISCONTINUED | OUTPATIENT
Start: 2022-04-22 | End: 2022-04-23 | Stop reason: HOSPADM

## 2022-04-22 RX ORDER — OXYTOCIN/0.9 % SODIUM CHLORIDE 30/500 ML
85 PLASTIC BAG, INJECTION (ML) INTRAVENOUS ONCE
Status: DISCONTINUED | OUTPATIENT
Start: 2022-04-22 | End: 2022-04-23 | Stop reason: HOSPADM

## 2022-04-22 RX ORDER — ACETAMINOPHEN 500 MG
1000 TABLET ORAL EVERY 6 HOURS
Status: DISCONTINUED | OUTPATIENT
Start: 2022-04-22 | End: 2022-04-23 | Stop reason: HOSPADM

## 2022-04-22 RX ADMIN — IBUPROFEN 800 MG: 800 TABLET, FILM COATED ORAL at 18:26

## 2022-04-22 RX ADMIN — ACETAMINOPHEN 1000 MG: 500 TABLET, FILM COATED ORAL at 13:35

## 2022-04-22 RX ADMIN — PRENATAL VIT W/ FE FUMARATE-FA TAB 27-0.8 MG 1 TABLET: 27-0.8 TAB at 08:09

## 2022-04-22 RX ADMIN — DOCUSATE SODIUM 100 MG: 100 CAPSULE, LIQUID FILLED ORAL at 08:09

## 2022-04-22 RX ADMIN — IBUPROFEN 800 MG: 800 TABLET, FILM COATED ORAL at 09:46

## 2022-04-22 RX ADMIN — FERROUS SULFATE TAB EC 324 MG (65 MG FE EQUIVALENT) 324 MG: 324 (65 FE) TABLET DELAYED RESPONSE at 18:26

## 2022-04-22 RX ADMIN — ACETAMINOPHEN 1000 MG: 500 TABLET, FILM COATED ORAL at 08:09

## 2022-04-22 RX ADMIN — ACETAMINOPHEN 1000 MG: 500 TABLET, FILM COATED ORAL at 19:22

## 2022-04-22 RX ADMIN — FERROUS SULFATE TAB EC 324 MG (65 MG FE EQUIVALENT) 324 MG: 324 (65 FE) TABLET DELAYED RESPONSE at 08:09

## 2022-04-22 RX ADMIN — DOCUSATE SODIUM 100 MG: 100 CAPSULE, LIQUID FILLED ORAL at 20:31

## 2022-04-22 NOTE — ANESTHESIA POSTPROCEDURE EVALUATION
Patient: Ginette Lara    Procedure Summary     Date: 04/21/22 Room / Location:     Anesthesia Start: 1323 Anesthesia Stop: 2155    Procedure: LABOR ANALGESIA Diagnosis:     Scheduled Providers:  Provider: Naomie Baez CRNA    Anesthesia Type: epidural ASA Status: 2          Anesthesia Type: epidural    Vitals  Vitals Value Taken Time   BP 0/0 04/21/22 2212   Temp 97.8 °F (36.6 °C) 04/21/22 2210   Pulse 88 04/21/22 2222   Resp 16 04/21/22 2001   SpO2 98 % 04/21/22 2222   Vitals shown include unvalidated device data.        Post Anesthesia Care and Evaluation    Patient location during evaluation: bedside  Patient participation: complete - patient participated  Level of consciousness: awake and awake and alert  Pain score: 0  Pain management: satisfactory to patient  Airway patency: patent  Anesthetic complications: No anesthetic complications  PONV Status: none  Cardiovascular status: acceptable and stable  Respiratory status: acceptable, room air and spontaneous ventilation  Hydration status: acceptable  Post Neuraxial Block status: Motor and sensory function returned to baseline and No signs or symptoms of PDPH

## 2022-04-23 VITALS
RESPIRATION RATE: 18 BRPM | HEIGHT: 64 IN | WEIGHT: 198 LBS | SYSTOLIC BLOOD PRESSURE: 95 MMHG | OXYGEN SATURATION: 98 % | DIASTOLIC BLOOD PRESSURE: 50 MMHG | HEART RATE: 77 BPM | TEMPERATURE: 97.9 F | BODY MASS INDEX: 33.8 KG/M2

## 2022-04-23 PROCEDURE — 0503F POSTPARTUM CARE VISIT: CPT | Performed by: OBSTETRICS & GYNECOLOGY

## 2022-04-23 RX ORDER — ACETAMINOPHEN 500 MG
1000 TABLET ORAL EVERY 6 HOURS PRN
Qty: 30 TABLET | Refills: 1 | Status: SHIPPED | OUTPATIENT
Start: 2022-04-23 | End: 2022-05-09

## 2022-04-23 RX ORDER — IBUPROFEN 800 MG/1
800 TABLET ORAL EVERY 8 HOURS PRN
Qty: 30 TABLET | Refills: 1 | Status: SHIPPED | OUTPATIENT
Start: 2022-04-23 | End: 2022-05-09

## 2022-04-23 RX ADMIN — PRENATAL VIT W/ FE FUMARATE-FA TAB 27-0.8 MG 1 TABLET: 27-0.8 TAB at 08:26

## 2022-04-23 RX ADMIN — ACETAMINOPHEN 1000 MG: 500 TABLET, FILM COATED ORAL at 05:25

## 2022-04-23 RX ADMIN — DOCUSATE SODIUM 100 MG: 100 CAPSULE, LIQUID FILLED ORAL at 08:26

## 2022-04-23 RX ADMIN — FERROUS SULFATE TAB EC 324 MG (65 MG FE EQUIVALENT) 324 MG: 324 (65 FE) TABLET DELAYED RESPONSE at 08:26

## 2022-05-09 ENCOUNTER — OFFICE VISIT (OUTPATIENT)
Dept: OBSTETRICS AND GYNECOLOGY | Facility: CLINIC | Age: 32
End: 2022-05-09

## 2022-05-09 PROCEDURE — 0503F POSTPARTUM CARE VISIT: CPT | Performed by: OBSTETRICS & GYNECOLOGY

## 2022-06-01 ENCOUNTER — OFFICE VISIT (OUTPATIENT)
Dept: FAMILY MEDICINE CLINIC | Facility: CLINIC | Age: 32
End: 2022-06-01

## 2022-06-01 VITALS
DIASTOLIC BLOOD PRESSURE: 67 MMHG | SYSTOLIC BLOOD PRESSURE: 105 MMHG | HEIGHT: 64 IN | OXYGEN SATURATION: 98 % | BODY MASS INDEX: 33.99 KG/M2 | HEART RATE: 95 BPM | TEMPERATURE: 99.6 F

## 2022-06-01 DIAGNOSIS — J06.9 VIRAL UPPER RESPIRATORY TRACT INFECTION: Primary | ICD-10-CM

## 2022-06-01 DIAGNOSIS — R05.9 COUGH: ICD-10-CM

## 2022-06-01 PROCEDURE — 99213 OFFICE O/P EST LOW 20 MIN: CPT | Performed by: NURSE PRACTITIONER

## 2022-06-01 RX ORDER — DEXTROMETHORPHAN POLISTIREX 30 MG/5ML
60 SUSPENSION ORAL EVERY 12 HOURS SCHEDULED
Qty: 280 ML | Refills: 1 | Status: SHIPPED | OUTPATIENT
Start: 2022-06-01 | End: 2022-06-07

## 2022-06-01 NOTE — PROGRESS NOTES
Subjective   Ginette Lara is a 32 y.o. female. Sinusitis     History of Present Illness   Patient presents today for sinusitis. She says symptoms started on Saturday. Symptoms include: sinus pressure, congestion, headaches. Patient feels her symptoms have worsened. She is breastfeeding. Her son is sick with similar symptoms. Takes zyrtec daily.    The following portions of the patient's history were reviewed and updated as appropriate: allergies, current medications, past family history, past medical history, past social history, past surgical history, and problem list.    Review of Systems   Constitutional: Negative for chills, fatigue and fever.   HENT: Positive for congestion and sinus pressure. Negative for ear pain, rhinorrhea and sore throat.    Eyes: Negative for blurred vision, double vision and visual disturbance.   Respiratory: Negative for cough, chest tightness, shortness of breath and wheezing.    Cardiovascular: Negative for chest pain, palpitations and leg swelling.   Gastrointestinal: Negative for abdominal pain, diarrhea, nausea and vomiting.   Endocrine: Negative for cold intolerance and heat intolerance.   Genitourinary: Negative for difficulty urinating, dysuria, frequency and hematuria.   Musculoskeletal: Negative for arthralgias, back pain, neck pain and neck stiffness.   Skin: Negative for dry skin, pallor, rash, skin lesions and wound.   Allergic/Immunologic: Negative for environmental allergies, food allergies and immunocompromised state.   Neurological: Positive for headache. Negative for dizziness, syncope, weakness, light-headedness and confusion.   Hematological: Negative for adenopathy. Does not bruise/bleed easily.   Psychiatric/Behavioral: Negative for self-injury, sleep disturbance, suicidal ideas, depressed mood and stress. The patient is not nervous/anxious.        Vitals:    06/01/22 1428   BP: 105/67   Pulse: 95   Temp: 99.6 °F (37.6 °C)   SpO2: 98%     Body mass  index is 33.99 kg/m².    Objective   Physical Exam  Vitals and nursing note reviewed.   Constitutional:       General: She is not in acute distress.     Appearance: She is well-developed. She is not ill-appearing.   HENT:      Head: Normocephalic.      Right Ear: Hearing, tympanic membrane, ear canal and external ear normal.      Left Ear: Hearing, tympanic membrane, ear canal and external ear normal.      Nose: Mucosal edema present.      Mouth/Throat:      Lips: Pink.      Mouth: Mucous membranes are moist.      Pharynx: Oropharynx is clear. Uvula midline. No oropharyngeal exudate.   Eyes:      General: Lids are normal. Gaze aligned appropriately.      Conjunctiva/sclera: Conjunctivae normal.      Pupils: Pupils are equal, round, and reactive to light.   Neck:      Thyroid: No thyroid mass, thyromegaly or thyroid tenderness.   Cardiovascular:      Rate and Rhythm: Normal rate and regular rhythm.      Heart sounds: Normal heart sounds, S1 normal and S2 normal. No murmur heard.  Pulmonary:      Effort: Pulmonary effort is normal.      Breath sounds: Normal breath sounds and air entry. No decreased breath sounds, wheezing, rhonchi or rales.   Abdominal:      General: Abdomen is flat. Bowel sounds are normal.      Palpations: Abdomen is soft.      Tenderness: There is no abdominal tenderness.   Musculoskeletal:         General: Normal range of motion.      Cervical back: Full passive range of motion without pain, normal range of motion and neck supple.   Lymphadenopathy:      Cervical: No cervical adenopathy.   Skin:     General: Skin is warm and dry.   Neurological:      General: No focal deficit present.      Mental Status: She is alert and oriented to person, place, and time.      Coordination: Coordination is intact.      Gait: Gait is intact.   Psychiatric:         Attention and Perception: Attention normal.         Mood and Affect: Mood normal.         Speech: Speech normal.         Behavior: Behavior normal.  "Behavior is cooperative.         Thought Content: Thought content normal.         Cognition and Memory: Cognition normal.         Judgment: Judgment normal.           Assessment & Plan   Diagnoses and all orders for this visit:    1. Viral upper respiratory tract infection (Primary)    2. Cough  -     dextromethorphan polistirex ER (Delsym) 30 MG/5ML Suspension Extended Release oral suspension; Take 10 mL by mouth Every 12 (Twelve) Hours.  Dispense: 280 mL; Refill: 1    Declined COVID 19 test.  URI also known as the \"common cold\" is a viral infection, no antibiotics are necessary. We will treat symptoms and encourage plenty of rest and hydration. Duration of symptoms is 1-1.5 weeks. It's important to practice good hand hygiene. Cough in bend of arm, not your hand.  Take delsym 10 ml every 12 hours PRN for cough.    If symptoms do not improve or worsen, patient was instructed to return to clinic for further evaluation.         This document has been electronically signed by SOLEDAD Vidal on  June 1, 2022 15:45 CDT             "

## 2022-06-03 ENCOUNTER — TELEPHONE (OUTPATIENT)
Dept: FAMILY MEDICINE CLINIC | Facility: CLINIC | Age: 32
End: 2022-06-03

## 2022-06-03 DIAGNOSIS — J01.90 ACUTE SINUSITIS, RECURRENCE NOT SPECIFIED, UNSPECIFIED LOCATION: Primary | ICD-10-CM

## 2022-06-03 RX ORDER — CEFUROXIME AXETIL 500 MG/1
500 TABLET ORAL 2 TIMES DAILY
Qty: 20 TABLET | Refills: 0 | Status: SHIPPED | OUTPATIENT
Start: 2022-06-03 | End: 2022-06-13

## 2022-06-03 RX ORDER — FLUCONAZOLE 150 MG/1
TABLET ORAL
Qty: 2 TABLET | Refills: 0 | Status: SHIPPED | OUTPATIENT
Start: 2022-06-03 | End: 2022-06-07

## 2022-06-03 NOTE — TELEPHONE ENCOUNTER
Antibiotic sent to [facility]. I also sent diflucan pills just in case she gets yeast infection from antibiotic.

## 2022-06-07 ENCOUNTER — POSTPARTUM VISIT (OUTPATIENT)
Dept: OBSTETRICS AND GYNECOLOGY | Facility: CLINIC | Age: 32
End: 2022-06-07

## 2022-06-07 VITALS
WEIGHT: 173 LBS | DIASTOLIC BLOOD PRESSURE: 80 MMHG | HEIGHT: 64 IN | SYSTOLIC BLOOD PRESSURE: 122 MMHG | BODY MASS INDEX: 29.53 KG/M2

## 2022-06-07 DIAGNOSIS — Z30.09 ENCOUNTER FOR COUNSELING REGARDING CONTRACEPTION: ICD-10-CM

## 2022-06-07 PROBLEM — O36.5930 POOR FETAL GROWTH AFFECTING MANAGEMENT OF MOTHER IN THIRD TRIMESTER: Status: RESOLVED | Noted: 2022-02-18 | Resolved: 2022-06-07

## 2022-06-07 PROBLEM — Z87.59 HISTORY OF POSTPARTUM DEPRESSION: Status: RESOLVED | Noted: 2021-09-21 | Resolved: 2022-06-07

## 2022-06-07 PROBLEM — O99.013 ANEMIA DURING PREGNANCY IN THIRD TRIMESTER: Status: RESOLVED | Noted: 2022-03-03 | Resolved: 2022-06-07

## 2022-06-07 PROBLEM — O21.9 NAUSEA AND VOMITING DURING PREGNANCY: Status: RESOLVED | Noted: 2021-09-21 | Resolved: 2022-06-07

## 2022-06-07 PROBLEM — O99.840 PREVIOUS BARIATRIC SURGERY COMPLICATING PREGNANCY: Status: RESOLVED | Noted: 2021-09-21 | Resolved: 2022-06-07

## 2022-06-07 PROBLEM — O09.93 SUPERVISION OF HIGH RISK PREGNANCY IN THIRD TRIMESTER: Status: RESOLVED | Noted: 2021-09-21 | Resolved: 2022-06-07

## 2022-06-07 PROBLEM — O99.019 ANEMIA AFFECTING PREGNANCY, ANTEPARTUM: Status: RESOLVED | Noted: 2022-02-08 | Resolved: 2022-06-07

## 2022-06-07 PROBLEM — R51.9 PREGNANCY HEADACHE IN THIRD TRIMESTER: Status: RESOLVED | Noted: 2021-09-21 | Resolved: 2022-06-07

## 2022-06-07 PROBLEM — Z34.90 ENCOUNTER FOR ELECTIVE INDUCTION OF LABOR: Status: RESOLVED | Noted: 2022-04-21 | Resolved: 2022-06-07

## 2022-06-07 PROBLEM — Z86.59 HISTORY OF POSTPARTUM DEPRESSION: Status: RESOLVED | Noted: 2021-09-21 | Resolved: 2022-06-07

## 2022-06-07 PROBLEM — O26.893 PREGNANCY HEADACHE IN THIRD TRIMESTER: Status: RESOLVED | Noted: 2021-09-21 | Resolved: 2022-06-07

## 2022-06-07 PROBLEM — O09.299 HISTORY OF FORCEPS DELIVERY IN PRIOR PREGNANCY, CURRENTLY PREGNANT: Status: RESOLVED | Noted: 2021-09-21 | Resolved: 2022-06-07

## 2022-06-07 PROCEDURE — 0503F POSTPARTUM CARE VISIT: CPT | Performed by: OBSTETRICS & GYNECOLOGY

## 2022-06-07 RX ORDER — ACETAMINOPHEN AND CODEINE PHOSPHATE 120; 12 MG/5ML; MG/5ML
1 SOLUTION ORAL DAILY
Qty: 28 TABLET | Refills: 12 | Status: SHIPPED | OUTPATIENT
Start: 2022-06-07 | End: 2023-06-07

## 2022-06-07 NOTE — PROGRESS NOTES
"Postpartum visit      Ginette Lara is a 32 y.o.  s/p Vaginal, Spontaneous on 2022 at 39w0d secondary to EIOL who presents today for postpartum check.  The patient states she is doing well.  Patient denies postpartum depression.  Menstrual cycles have not resumed.  Breastfeeding.  Desires POPs for contraception.  She has not resumed sexual intercourse.  Denies bowel or bladder issues.    PHYSICAL EXAM:    /80   Ht 162.6 cm (64\")   Wt 78.5 kg (173 lb)   LMP 2021 (Exact Date)   Breastfeeding Yes   BMI 29.70 kg/m²   Gen: NAD, AAO x3  Abd: Soft, NTND  Postpartum Depression Screening Questionnaire: 2    IMPRESSION/PLAN: Ginette Lara is a 32 y.o.  s/p Vaginal, Spontaneous on 2022.  Doing well.   - Recovered nicely from her delivery  - Restrictions lifted  - Contraception: POPs  - RTC 1 year for annual w/ pap smear    This document has been electronically signed by Clarissa Carias MD on 2022 11:17 CDT.  "

## 2023-08-29 ENCOUNTER — OFFICE VISIT (OUTPATIENT)
Dept: FAMILY MEDICINE CLINIC | Facility: CLINIC | Age: 33
End: 2023-08-29
Payer: COMMERCIAL

## 2023-08-29 VITALS
WEIGHT: 168.5 LBS | DIASTOLIC BLOOD PRESSURE: 72 MMHG | BODY MASS INDEX: 28.77 KG/M2 | SYSTOLIC BLOOD PRESSURE: 114 MMHG | OXYGEN SATURATION: 98 % | TEMPERATURE: 98.6 F | HEIGHT: 64 IN | HEART RATE: 99 BPM

## 2023-08-29 DIAGNOSIS — J01.10 ACUTE FRONTAL SINUSITIS, RECURRENCE NOT SPECIFIED: Primary | ICD-10-CM

## 2023-08-29 DIAGNOSIS — J30.9 ALLERGIC RHINITIS, UNSPECIFIED SEASONALITY, UNSPECIFIED TRIGGER: ICD-10-CM

## 2023-08-29 DIAGNOSIS — R05.1 ACUTE COUGH: ICD-10-CM

## 2023-08-29 PROCEDURE — 99213 OFFICE O/P EST LOW 20 MIN: CPT | Performed by: NURSE PRACTITIONER

## 2023-08-29 RX ORDER — FLUCONAZOLE 150 MG/1
TABLET ORAL
Qty: 2 TABLET | Refills: 0 | Status: SHIPPED | OUTPATIENT
Start: 2023-08-29

## 2023-08-29 RX ORDER — LORATADINE 10 MG/1
10 TABLET ORAL DAILY
Qty: 30 TABLET | Refills: 11 | Status: SHIPPED | OUTPATIENT
Start: 2023-08-29

## 2023-08-29 RX ORDER — CEFUROXIME AXETIL 500 MG/1
500 TABLET ORAL 2 TIMES DAILY
Qty: 20 TABLET | Refills: 0 | Status: SHIPPED | OUTPATIENT
Start: 2023-08-29 | End: 2023-09-08

## 2023-08-29 RX ORDER — DEXTROMETHORPHAN HYDROBROMIDE AND PROMETHAZINE HYDROCHLORIDE 15; 6.25 MG/5ML; MG/5ML
5 SYRUP ORAL 4 TIMES DAILY PRN
Qty: 240 ML | Refills: 0 | Status: SHIPPED | OUTPATIENT
Start: 2023-08-29

## 2023-08-29 NOTE — PROGRESS NOTES
"Subjective   Ginette Lara is a 33 y.o. female. Sinusitis     History of Present Illness   Patient presents today for sinusitis. Symptoms started 1 week ago. She was hoping symptoms would improve but they have gradually worsened. Symptoms include sinus pressure/tenderness, cough, rhinorrhea, \"metallic taste\". Cough is productive. Denies any fever or chills. No sick contacts.    The following portions of the patient's history were reviewed and updated as appropriate: allergies, current medications, past family history, past medical history, past social history, past surgical history, and problem list.      Vitals:    08/29/23 1304   BP: 114/72   BP Location: Left arm   Patient Position: Sitting   Cuff Size: Large Adult   Pulse: 99   Temp: 98.6 øF (37 øC)   TempSrc: Infrared   SpO2: 98%   Weight: 76.4 kg (168 lb 8 oz)   Height: 162.6 cm (64\")       Body mass index is 28.92 kg/mý.    Objective   Physical Exam  Vitals and nursing note reviewed.   Constitutional:       General: She is not in acute distress.     Appearance: She is well-developed. She is not ill-appearing.   HENT:      Head: Normocephalic.      Right Ear: Hearing, tympanic membrane, ear canal and external ear normal.      Left Ear: Hearing, tympanic membrane, ear canal and external ear normal.      Nose: Nose normal.      Right Sinus: Frontal sinus tenderness present. No maxillary sinus tenderness.      Left Sinus: Frontal sinus tenderness present. No maxillary sinus tenderness.      Mouth/Throat:      Lips: Pink.      Mouth: Mucous membranes are moist.      Pharynx: Oropharynx is clear. Uvula midline. Posterior oropharyngeal erythema present. No oropharyngeal exudate.   Eyes:      General: Lids are normal. Gaze aligned appropriately.      Conjunctiva/sclera: Conjunctivae normal.      Pupils: Pupils are equal, round, and reactive to light.   Neck:      Thyroid: No thyroid mass, thyromegaly or thyroid tenderness.   Cardiovascular:      Rate and " Rhythm: Normal rate and regular rhythm.      Heart sounds: Normal heart sounds, S1 normal and S2 normal. No murmur heard.  Pulmonary:      Effort: Pulmonary effort is normal.      Breath sounds: Normal breath sounds and air entry. No decreased breath sounds, wheezing, rhonchi or rales.   Abdominal:      General: Abdomen is flat. Bowel sounds are normal.      Palpations: Abdomen is soft.      Tenderness: There is no abdominal tenderness.   Musculoskeletal:         General: Normal range of motion.      Cervical back: Full passive range of motion without pain, normal range of motion and neck supple.   Lymphadenopathy:      Cervical: No cervical adenopathy.   Skin:     General: Skin is warm and dry.   Neurological:      General: No focal deficit present.      Mental Status: She is alert and oriented to person, place, and time.      Coordination: Coordination is intact.      Gait: Gait is intact.   Psychiatric:         Attention and Perception: Attention normal.         Mood and Affect: Mood normal.         Speech: Speech normal.         Behavior: Behavior normal. Behavior is cooperative.         Thought Content: Thought content normal.         Cognition and Memory: Cognition normal.         Judgment: Judgment normal.         Assessment & Plan   Diagnoses and all orders for this visit:    1. Acute frontal sinusitis, recurrence not specified (Primary)  -     cefuroxime (CEFTIN) 500 MG tablet; Take 1 tablet by mouth 2 (Two) Times a Day for 10 days.  Dispense: 20 tablet; Refill: 0    2. Acute cough  -     promethazine-dextromethorphan (PROMETHAZINE-DM) 6.25-15 MG/5ML syrup; Take 5 mL by mouth 4 (Four) Times a Day As Needed for Cough.  Dispense: 240 mL; Refill: 0    3. Allergic rhinitis, unspecified seasonality, unspecified trigger  -     loratadine (Claritin) 10 MG tablet; Take 1 tablet by mouth Daily.  Dispense: 30 tablet; Refill: 11    Other orders  -     fluconazole (Diflucan) 150 MG tablet; Take 1 tablet at onset of  symptoms and repeat in 3 days if still having symptoms.  Dispense: 2 tablet; Refill: 0      Symptoms consistent with sinusitis.  Take ceftin 500 mg BID for 10 days sinusitis.  Take loratadine 10 mg daily for allergies.  Take promethazine DM 5 ml QID PRN for cough.  Rest, stay well hydrated.               This document has been electronically signed by SOLEDAD Vidal on  August 29, 2023 13:51 CDT